# Patient Record
Sex: FEMALE | URBAN - NONMETROPOLITAN AREA
[De-identification: names, ages, dates, MRNs, and addresses within clinical notes are randomized per-mention and may not be internally consistent; named-entity substitution may affect disease eponyms.]

---

## 2017-09-28 ENCOUNTER — OFFICE VISIT (OUTPATIENT)
Dept: GASTROENTEROLOGY | Age: 32
End: 2017-09-28
Payer: COMMERCIAL

## 2017-09-28 VITALS
HEART RATE: 81 BPM | HEIGHT: 65 IN | SYSTOLIC BLOOD PRESSURE: 122 MMHG | WEIGHT: 197.2 LBS | OXYGEN SATURATION: 98 % | DIASTOLIC BLOOD PRESSURE: 70 MMHG | BODY MASS INDEX: 32.86 KG/M2

## 2017-09-28 DIAGNOSIS — R10.13 EPIGASTRIC DISCOMFORT: ICD-10-CM

## 2017-09-28 DIAGNOSIS — R74.01 ELEVATED AST (SGOT): ICD-10-CM

## 2017-09-28 DIAGNOSIS — K58.2 IRRITABLE BOWEL SYNDROME WITH BOTH CONSTIPATION AND DIARRHEA: ICD-10-CM

## 2017-09-28 DIAGNOSIS — R10.13 EPIGASTRIC DISCOMFORT: Primary | ICD-10-CM

## 2017-09-28 LAB
ALBUMIN SERPL-MCNC: 4 G/DL (ref 3.5–5.2)
ALP BLD-CCNC: 78 U/L (ref 35–104)
ALT SERPL-CCNC: 42 U/L (ref 5–33)
AST SERPL-CCNC: 64 U/L (ref 5–32)
BILIRUB SERPL-MCNC: <0.2 MG/DL (ref 0.2–1.2)
BILIRUBIN DIRECT: 0.1 MG/DL (ref 0–0.3)
BILIRUBIN, INDIRECT: 0.1 MG/DL (ref 0.1–1)
TOTAL PROTEIN: 6.8 G/DL (ref 6.6–8.7)

## 2017-09-28 PROCEDURE — 99203 OFFICE O/P NEW LOW 30 MIN: CPT | Performed by: NURSE PRACTITIONER

## 2017-09-28 RX ORDER — PANTOPRAZOLE SODIUM 20 MG/1
20 TABLET, DELAYED RELEASE ORAL DAILY
COMMUNITY

## 2017-09-28 RX ORDER — DICYCLOMINE HCL 20 MG
TABLET ORAL
COMMUNITY
Start: 2017-08-23 | End: 2017-09-28 | Stop reason: DRUGHIGH

## 2017-09-28 RX ORDER — SUCRALFATE ORAL 1 G/10ML
1 SUSPENSION ORAL
Qty: 900 ML | Refills: 2 | Status: SHIPPED | OUTPATIENT
Start: 2017-09-28

## 2017-09-28 RX ORDER — DICYCLOMINE HCL 20 MG
20 TABLET ORAL 3 TIMES DAILY
Qty: 90 TABLET | Refills: 11 | Status: SHIPPED | OUTPATIENT
Start: 2017-09-28

## 2017-09-28 ASSESSMENT — ENCOUNTER SYMPTOMS
BACK PAIN: 0
CONSTIPATION: 0
SORE THROAT: 0
RECTAL PAIN: 0
COUGH: 0
PHOTOPHOBIA: 0
ABDOMINAL PAIN: 1
BLOOD IN STOOL: 0
ABDOMINAL DISTENTION: 0
ANAL BLEEDING: 0
DIARRHEA: 1
WHEEZING: 0
CHOKING: 0

## 2017-09-29 ENCOUNTER — TELEPHONE (OUTPATIENT)
Dept: GASTROENTEROLOGY | Age: 32
End: 2017-09-29

## 2017-10-01 LAB — TISSUE TRANSGLUTAMINASE IGA: 1 U/ML (ref 0–3)

## 2017-10-02 ENCOUNTER — TELEPHONE (OUTPATIENT)
Dept: GASTROENTEROLOGY | Age: 32
End: 2017-10-02

## 2017-10-03 NOTE — TELEPHONE ENCOUNTER
Please let Jacy know that I have the results of labs back on her. The lab I got to check for celiac sprue was normal level. I wanted to check the HFP in a few weeks, but for some reason she got it done the day I saw her in office. Her AST has come down a lot, this is good. It was 187, now it is 64. Normal is <32. Lets recheck HFP in 6 weeks.

## 2017-11-01 ENCOUNTER — TELEPHONE (OUTPATIENT)
Dept: GASTROENTEROLOGY | Age: 32
End: 2017-11-01

## 2017-11-13 ENCOUNTER — TELEPHONE (OUTPATIENT)
Dept: GASTROENTEROLOGY | Age: 32
End: 2017-11-13

## 2017-11-13 NOTE — TELEPHONE ENCOUNTER
See last phone encounter dated 11-1-17. Patient sent me a my chart message and I decided to call her on the phone to get additional information. 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 I will send to [facility] aprn for review. Please advise.  Lewis lozano

## 2017-11-14 NOTE — TELEPHONE ENCOUNTER
Terry Neither, I looked at the colon op note from last year with Dr Katheran Goltz. She has large internal hemorrhoid and anal fissure. If she is wanting tx for her currrent rectal bleeding there are a few options:  (1) appt with Dr Tony Calvin for hemorrhoidal banding  (2) I can escribe RX topical meds for hemorrhoids  (3) if she is also having rectal pain it is prob the fissure that is the problem and in this case I can escribe meds for this. Let me know what she wants to do, thanks.

## 2017-11-14 NOTE — TELEPHONE ENCOUNTER
I called the patient and she said thank you for looking at this, she did find last night some cream she had from where  had prescribed and she will use this for the anal fissure, if this does help she will call back, she said the hemorrhoids is not the problem but she feels the fissure is, again she will call back if the medication cream she has does not help her.   marta

## 2017-11-20 NOTE — TELEPHONE ENCOUNTER
I would get eval via her OB/GYN. From a GI standpoint, the main thing I would want to rule out is a rectovaginal fistula, but I would think she would have infections if this was the case. From our standpoint, I would do a flex sig and if normal, then a barium enema to rule out a tract from rectum to vaginal area.

## 2017-11-20 NOTE — TELEPHONE ENCOUNTER
Patient sent a [employer] message to Binh and I sent it to Luciano to address with both Palak Guan and Binh out today. Patient has called now also, adding to her issues. Patient has been talking to her aunt who is a nurse at [facility] and she also wanted her to call back with this information. Patient states she is only having rectal bleeding with her periods. It starts with her period and ends with it. There is no pain involved. I went back over Palak Guan and Lauren 56 conversation and recommendations with patient. I stated I would ask Luciano who is the only APRN in today and see if Dr Laura Jimenez would also advise, is this a GYN question, does she need banding or hemorrhoid cream? She does not have the name of the medication Dr Deyanira Lerma gave her, she just states it had an applicator and it was a white tube. I will call patient back with advice when I get it on her cell [phone].

## 2017-11-20 NOTE — TELEPHONE ENCOUNTER
Patient advised of Dr Jerry Steven recommendations and that JAMIE Ward will also be forwarded this information for when she comes back tomorrow. Patient will call her GYN and set up eval and let us know if we need to proceed with Dr Akhtar's recommended procedures. She voiced understanding and appreciation.

## 2024-07-01 DIAGNOSIS — D50.8 OTHER IRON DEFICIENCY ANEMIA: Primary | ICD-10-CM

## 2024-07-10 ENCOUNTER — LAB (OUTPATIENT)
Dept: LAB | Facility: HOSPITAL | Age: 39
End: 2024-07-10
Payer: COMMERCIAL

## 2024-07-10 ENCOUNTER — CONSULT (OUTPATIENT)
Dept: ONCOLOGY | Facility: CLINIC | Age: 39
End: 2024-07-10
Payer: COMMERCIAL

## 2024-07-10 VITALS
HEART RATE: 103 BPM | TEMPERATURE: 98.8 F | WEIGHT: 194 LBS | SYSTOLIC BLOOD PRESSURE: 112 MMHG | HEIGHT: 65 IN | BODY MASS INDEX: 32.32 KG/M2 | RESPIRATION RATE: 16 BRPM | OXYGEN SATURATION: 99 % | DIASTOLIC BLOOD PRESSURE: 68 MMHG

## 2024-07-10 DIAGNOSIS — E54 VITAMIN C DEFICIENCY: ICD-10-CM

## 2024-07-10 DIAGNOSIS — D50.8 OTHER IRON DEFICIENCY ANEMIA: Primary | ICD-10-CM

## 2024-07-10 DIAGNOSIS — E53.8 B12 DEFICIENCY: ICD-10-CM

## 2024-07-10 DIAGNOSIS — K64.9 BLEEDING HEMORRHOIDS: ICD-10-CM

## 2024-07-10 PROBLEM — D50.9 IRON DEFICIENCY ANEMIA: Status: ACTIVE | Noted: 2024-07-10

## 2024-07-10 LAB
ALBUMIN SERPL-MCNC: 4.3 G/DL (ref 3.5–5.2)
ALBUMIN/GLOB SERPL: 1.7 G/DL
ALP SERPL-CCNC: 75 U/L (ref 39–117)
ALT SERPL W P-5'-P-CCNC: 7 U/L (ref 1–33)
ANION GAP SERPL CALCULATED.3IONS-SCNC: 8 MMOL/L (ref 5–15)
AST SERPL-CCNC: 13 U/L (ref 1–32)
BASOPHILS # BLD AUTO: 0.03 10*3/MM3 (ref 0–0.2)
BASOPHILS NFR BLD AUTO: 0.8 % (ref 0–1.5)
BILIRUB SERPL-MCNC: 0.4 MG/DL (ref 0–1.2)
BUN SERPL-MCNC: 9 MG/DL (ref 6–20)
BUN/CREAT SERPL: 10.7 (ref 7–25)
CALCIUM SPEC-SCNC: 9.5 MG/DL (ref 8.6–10.5)
CHLORIDE SERPL-SCNC: 106 MMOL/L (ref 98–107)
CO2 SERPL-SCNC: 23 MMOL/L (ref 22–29)
CREAT SERPL-MCNC: 0.84 MG/DL (ref 0.57–1)
DEPRECATED RDW RBC AUTO: 40.9 FL (ref 37–54)
EGFRCR SERPLBLD CKD-EPI 2021: 92.5 ML/MIN/1.73
EOSINOPHIL # BLD AUTO: 0.1 10*3/MM3 (ref 0–0.4)
EOSINOPHIL NFR BLD AUTO: 2.7 % (ref 0.3–6.2)
ERYTHROCYTE [DISTWIDTH] IN BLOOD BY AUTOMATED COUNT: 12.1 % (ref 12.3–15.4)
FERRITIN SERPL-MCNC: 8.82 NG/ML (ref 13–150)
FOLATE SERPL-MCNC: 10.6 NG/ML (ref 4.78–24.2)
GLOBULIN UR ELPH-MCNC: 2.5 GM/DL
GLUCOSE SERPL-MCNC: 83 MG/DL (ref 65–99)
HCT VFR BLD AUTO: 37.3 % (ref 34–46.6)
HGB BLD-MCNC: 11.8 G/DL (ref 12–15.9)
HOLD SPECIMEN: NORMAL
IMM GRANULOCYTES # BLD AUTO: 0 10*3/MM3 (ref 0–0.05)
IMM GRANULOCYTES NFR BLD AUTO: 0 % (ref 0–0.5)
IRON 24H UR-MRATE: 62 MCG/DL (ref 37–145)
IRON SATN MFR SERPL: 13 % (ref 20–50)
LYMPHOCYTES # BLD AUTO: 1.15 10*3/MM3 (ref 0.7–3.1)
LYMPHOCYTES NFR BLD AUTO: 31.4 % (ref 19.6–45.3)
MCH RBC QN AUTO: 29.1 PG (ref 26.6–33)
MCHC RBC AUTO-ENTMCNC: 31.6 G/DL (ref 31.5–35.7)
MCV RBC AUTO: 92.1 FL (ref 79–97)
MONOCYTES # BLD AUTO: 0.3 10*3/MM3 (ref 0.1–0.9)
MONOCYTES NFR BLD AUTO: 8.2 % (ref 5–12)
NEUTROPHILS NFR BLD AUTO: 2.08 10*3/MM3 (ref 1.7–7)
NEUTROPHILS NFR BLD AUTO: 56.9 % (ref 42.7–76)
NRBC BLD AUTO-RTO: 0 /100 WBC (ref 0–0.2)
PLATELET # BLD AUTO: 270 10*3/MM3 (ref 140–450)
PMV BLD AUTO: 10.4 FL (ref 6–12)
POTASSIUM SERPL-SCNC: 3.6 MMOL/L (ref 3.5–5.2)
PROT SERPL-MCNC: 6.8 G/DL (ref 6–8.5)
RBC # BLD AUTO: 4.05 10*6/MM3 (ref 3.77–5.28)
SODIUM SERPL-SCNC: 137 MMOL/L (ref 136–145)
TIBC SERPL-MCNC: 478 MCG/DL (ref 298–536)
TRANSFERRIN SERPL-MCNC: 321 MG/DL (ref 200–360)
VIT B12 BLD-MCNC: 418 PG/ML (ref 211–946)
WBC NRBC COR # BLD AUTO: 3.66 10*3/MM3 (ref 3.4–10.8)
WHOLE BLOOD HOLD SPECIMEN: NORMAL
WHOLE BLOOD HOLD SPECIMEN: NORMAL

## 2024-07-10 PROCEDURE — 84466 ASSAY OF TRANSFERRIN: CPT

## 2024-07-10 PROCEDURE — 82728 ASSAY OF FERRITIN: CPT

## 2024-07-10 PROCEDURE — 82746 ASSAY OF FOLIC ACID SERUM: CPT

## 2024-07-10 PROCEDURE — 99205 OFFICE O/P NEW HI 60 MIN: CPT | Performed by: NURSE PRACTITIONER

## 2024-07-10 PROCEDURE — 82607 VITAMIN B-12: CPT

## 2024-07-10 PROCEDURE — 80053 COMPREHEN METABOLIC PANEL: CPT

## 2024-07-10 PROCEDURE — 82180 ASSAY OF ASCORBIC ACID: CPT

## 2024-07-10 PROCEDURE — 83540 ASSAY OF IRON: CPT

## 2024-07-10 PROCEDURE — 85025 COMPLETE CBC W/AUTO DIFF WBC: CPT

## 2024-07-10 PROCEDURE — 36415 COLL VENOUS BLD VENIPUNCTURE: CPT

## 2024-07-10 RX ORDER — DIPHENHYDRAMINE HYDROCHLORIDE 50 MG/ML
50 INJECTION INTRAMUSCULAR; INTRAVENOUS AS NEEDED
OUTPATIENT
Start: 2024-07-26

## 2024-07-10 RX ORDER — SODIUM CHLORIDE 9 MG/ML
20 INJECTION, SOLUTION INTRAVENOUS ONCE
OUTPATIENT
Start: 2024-07-26

## 2024-07-10 RX ORDER — ACETAMINOPHEN 325 MG/1
650 TABLET ORAL ONCE
Status: CANCELLED | OUTPATIENT
Start: 2024-07-11

## 2024-07-10 RX ORDER — ACETAMINOPHEN 325 MG/1
650 TABLET ORAL ONCE
OUTPATIENT
Start: 2024-07-19

## 2024-07-10 RX ORDER — SODIUM CHLORIDE 9 MG/ML
20 INJECTION, SOLUTION INTRAVENOUS ONCE
OUTPATIENT
Start: 2024-07-19

## 2024-07-10 RX ORDER — FAMOTIDINE 10 MG/ML
20 INJECTION, SOLUTION INTRAVENOUS AS NEEDED
OUTPATIENT
Start: 2024-07-19

## 2024-07-10 RX ORDER — DIPHENHYDRAMINE HYDROCHLORIDE 50 MG/ML
50 INJECTION INTRAMUSCULAR; INTRAVENOUS AS NEEDED
OUTPATIENT
Start: 2024-07-19

## 2024-07-10 RX ORDER — DIPHENHYDRAMINE HYDROCHLORIDE 50 MG/ML
50 INJECTION INTRAMUSCULAR; INTRAVENOUS AS NEEDED
Status: CANCELLED | OUTPATIENT
Start: 2024-07-11

## 2024-07-10 RX ORDER — FAMOTIDINE 10 MG/ML
20 INJECTION, SOLUTION INTRAVENOUS AS NEEDED
OUTPATIENT
Start: 2024-07-26

## 2024-07-10 RX ORDER — DIPHENHYDRAMINE HCL 25 MG
25 CAPSULE ORAL ONCE
OUTPATIENT
Start: 2024-07-19

## 2024-07-10 RX ORDER — DIPHENHYDRAMINE HCL 25 MG
25 CAPSULE ORAL ONCE
OUTPATIENT
Start: 2024-07-26

## 2024-07-10 RX ORDER — FERRIC MALTOL 30 MG/1
30 CAPSULE ORAL 2 TIMES DAILY
COMMUNITY

## 2024-07-10 RX ORDER — SODIUM CHLORIDE 9 MG/ML
20 INJECTION, SOLUTION INTRAVENOUS ONCE
Status: CANCELLED | OUTPATIENT
Start: 2024-07-11

## 2024-07-10 RX ORDER — ACETAMINOPHEN 325 MG/1
650 TABLET ORAL ONCE
OUTPATIENT
Start: 2024-07-26

## 2024-07-10 RX ORDER — DIPHENHYDRAMINE HCL 25 MG
25 CAPSULE ORAL ONCE
Status: CANCELLED | OUTPATIENT
Start: 2024-07-11

## 2024-07-10 RX ORDER — FAMOTIDINE 10 MG/ML
20 INJECTION, SOLUTION INTRAVENOUS AS NEEDED
Status: CANCELLED | OUTPATIENT
Start: 2024-07-11

## 2024-07-10 NOTE — LETTER
July 10, 2024     THANIA Huerta  [address] [address]    Patient: Marni Adair   YOB: 1985   Date of Visit: 7/10/2024     Dear THANIA Huerta:       Thank you for referring Marni Adair to me for evaluation. Below are the relevant portions of my assessment and plan of care.    If you have questions, please do not hesitate to call me. I look forward to following Marni along with you.         Sincerely,        THANIA Duncan        CC: No Recipients    Carolyn Abraham APRN  07/10/24 1309  Sign when Signing Visit  [facility]  [address]  [facility] [place] [address]  [phone]    Patient Name: Marni Adair  Encounter Date: 07/10/2024   YOB: 1985  Patient Number: [id number]    Initial Note       HISTORY OF PRESENT ILLNESS: Marni Adair is a 38 y.o. female referred by THANIA Huerta for diagnostic and management recommendations for iron deficiency. History is obtained from patient. History is considered to be accurate.  History of Present Illness    The patient has been under the care of a hematologist in [place] for chronic anemia, a condition that she states has persisted for over a decade.  In 2021, she underwent a hysterectomy in an attempt to stop the iron deficiency.  She continues to have low iron and was found to have bleeding hemorrhoids.  She was scheduled for surgery in 12/2023. She last saw Dr. Edwin Shin, colorectal surgeon at [facility] in 12/2023, but declined to return. She experiences severe pain, which she manages with ibuprofen, but Tylenol proves ineffective. She typically takes two ibuprofen tablets, which requires her to rest for 30 to 45 minutes.   She feels her her rectal bleeding is exacerbated by Linzess 145 mcg which she takes for for chronic constipation but it causes diarrhea.  Her gastroenterologist in [place] reduced her Linzess  dosage to 72 mcg, which has improved her diarrhea. She consumes crackers and yogurt prior to taking Linzess.     She has been receiving Venofer infusions for iron deficiency, with the last infusion administered in April 2024.     She also has history of vitamin C deficiency.  She has not been taking vitamin C for the past few weeks due to depletion. She has a bruise on the side of her leg. Her B12 levels have consistently decreased, and she was receiving monthly B12 injections by Dr. Wright.         She had a workup for bleeding disorder.  Below is the charting from care everywhere.    Interpretation provided by Surinder Rodney MD: Marni Adair is a 37-year-old female with iron deficiency anemia and is referred to hematology to rule out bleeding disorder. She has had hemorrhoidal bleeding in the past, without bleeding issues during a hysterectomy in 2021.      A von Willebrand factor profile and platelet function studies are performed. The platelet count (321,000/uL) and hematocrit (36%) are normal. The PT (13.0 sec) and PTT (25.6 sec) are normal. Fibrinogen (288 mg/dL) is normal. Factor VIII activity (435%), vWF antigen (193%), and ristocetin cofactor activity (180%) are elevated. The PFA-100 platelet function screen shows closure times of 117.0 sec (ADP) which is slightly prolonged and 144.0 sec (epinephrine), which is normal.    These results do not support a diagnosis of von Willebrand disease. However, factor VIII, von Willebrand factor, and ristocetin cofactor activity are elevated above the patients baseline in the setting of acute stress (as evidenced by the elevated factor VIII activity). If there is significant clinical concern for von Willebrand disease or defect in platelet function, testing may be repeated as an outpatient once stressors have resolved. Additionally, the mild prolongation of the PFA-100 closure time with ADP is not likely clinically significant. These results do not support a diagnosis of  a defect in platelet function.      He has been taking Accrufer but it causes significant GI distress.     [place] - Colorectal Surgery   [place] - Gastro  [place]  - Hematology     PAST MEDICAL HISTORY:  ALLERGIES:  Not on File  CURRENT MEDICATIONS:  Outpatient Encounter Medications as of 7/10/2024   Medication Sig Dispense Refill   • linaclotide (Linzess) 72 MCG capsule capsule Take 1 capsule by mouth Every Morning Before Breakfast.     • ACCRUFeR 30 MG capsule Take 1 capsule by mouth 2 (Two) Times a Day. Causes GI issues (Patient not taking: Reported on 7/10/2024)       No facility-administered encounter medications on file as of 7/10/2024.     ADULT ILLNESSES:  Patient Active Problem List   Diagnosis Code   • Iron deficiency anemia D50.9       HEALTH MAINTENANCE ITEMS:  Health Maintenance Due   Topic Date Due   • BMI FOLLOWUP  Never done   • TDAP/TD VACCINES (1 - Tdap) Never done   • COVID-19 Vaccine (4 - 2023-24 season) 09/01/2023   • HEPATITIS C SCREENING  Never done   • ANNUAL PHYSICAL  Never done       <no information>  Last Completed Colonoscopy       This patient has no relevant Health Maintenance data.          Immunization History   Administered Date(s) Administered   • COVID-19 (MODERNA) 1st,2nd,3rd Dose Monovalent 05/14/2021   • COVID-19 (MODERNA) Monovalent Original Booster 01/10/2022     Last Completed Mammogram       This patient has no relevant Health Maintenance data.              FAMILY HISTORY:  History reviewed. No pertinent family history.  SOCIAL HISTORY:  Social History     Socioeconomic History   • Marital status:    Tobacco Use   • Smoking status: Never   Vaping Use   • Vaping status: Some Days   Substance and Sexual Activity   • Alcohol use: Yes     Comment: occasionally   • Drug use: Never   • Sexual activity: Defer       REVIEW OF SYSTEMS:  Review of Systems   Constitutional:  Positive for fatigue.   Cardiovascular:  Positive for palpitations.   Gastrointestinal:  Positive for anal  "bleeding.   Genitourinary:         Hysterectomy Dec 2021         /68   Pulse 103   Temp 98.8 °F (37.1 °C)   Resp 16   Ht 165.1 cm (65\")   Wt 88 kg (194 lb)   SpO2 99%   BMI 32.28 kg/m²  Body surface area is 1.95 meters squared.    Pain Score    07/10/24 0816   PainSc: 0-No pain         Physical Exam  Constitutional:       Appearance: Normal appearance.   HENT:      Head: Normocephalic and atraumatic.   Cardiovascular:      Rate and Rhythm: Normal rate and regular rhythm.   Pulmonary:      Effort: Pulmonary effort is normal.      Breath sounds: Normal breath sounds.   Abdominal:      General: Bowel sounds are normal.      Palpations: Abdomen is soft.   Musculoskeletal:      Right lower leg: No edema.      Left lower leg: No edema.   Skin:     General: Skin is warm and dry.   Neurological:      Mental Status: She is alert and oriented to person, place, and time.   Psychiatric:         Attention and Perception: Attention normal.         Mood and Affect: Mood normal.         Judgment: Judgment normal.       Marni Adair reports a pain score of 0.  Given her pain assessment as noted, treatment options were discussed and the following options were decided upon as a follow-up plan to address the patient's pain:  No intervention indicated. .      ASSESSMENT / PLAN:  Recent Results (from the past 168 hour(s))   Lavender Top    Collection Time: 07/10/24  8:05 AM   Result Value Ref Range    Extra Tube hold for add-on    Lavender Top    Collection Time: 07/10/24  8:05 AM   Result Value Ref Range    Extra Tube hold for add-on    Grn Na Hep No Gel    Collection Time: 07/10/24  8:05 AM    Specimen: Arm, Right; Blood   Result Value Ref Range    Extra Tube Hold for add-ons.    Grn Na Hep No Gel    Collection Time: 07/10/24  8:05 AM    Specimen: Arm, Right; Blood   Result Value Ref Range    Extra Tube Hold for add-ons.    Gold Top - SST    Collection Time: 07/10/24  8:05 AM   Result Value Ref Range    Extra Tube Hold for " add-ons.    Gold Top - SST    Collection Time: 07/10/24  8:05 AM   Result Value Ref Range    Extra Tube Hold for add-ons.    Gold Top - SST    Collection Time: 07/10/24  8:05 AM   Result Value Ref Range    Extra Tube Hold for add-ons.    Royal Blue EDTA    Collection Time: 07/10/24  8:05 AM    Specimen: Arm, Right; Blood   Result Value Ref Range    Extra Tube Hold for add-ons.    Comprehensive Metabolic Panel    Collection Time: 07/10/24  8:07 AM    Specimen: Arm, Right; Blood   Result Value Ref Range    Glucose 83 65 - 99 mg/dL    BUN 9 6 - 20 mg/dL    Creatinine 0.84 0.57 - 1.00 mg/dL    Sodium 137 136 - 145 mmol/L    Potassium 3.6 3.5 - 5.2 mmol/L    Chloride 106 98 - 107 mmol/L    CO2 23.0 22.0 - 29.0 mmol/L    Calcium 9.5 8.6 - 10.5 mg/dL    Total Protein 6.8 6.0 - 8.5 g/dL    Albumin 4.3 3.5 - 5.2 g/dL    ALT (SGPT) 7 1 - 33 U/L    AST (SGOT) 13 1 - 32 U/L    Alkaline Phosphatase 75 39 - 117 U/L    Total Bilirubin 0.4 0.0 - 1.2 mg/dL    Globulin 2.5 gm/dL    A/G Ratio 1.7 g/dL    BUN/Creatinine Ratio 10.7 7.0 - 25.0    Anion Gap 8.0 5.0 - 15.0 mmol/L    eGFR 92.5 >60.0 mL/min/1.73   Iron and TIBC    Collection Time: 07/10/24  8:07 AM    Specimen: Arm, Right; Blood   Result Value Ref Range    Iron 62 37 - 145 mcg/dL    Iron Saturation (TSAT) 13 (L) 20 - 50 %    Transferrin 321 200 - 360 mg/dL    TIBC 478 298 - 536 mcg/dL   Ferritin    Collection Time: 07/10/24  8:07 AM    Specimen: Arm, Right; Blood   Result Value Ref Range    Ferritin 8.82 (L) 13.00 - 150.00 ng/mL   CBC Auto Differential    Collection Time: 07/10/24  8:07 AM    Specimen: Arm, Right; Blood   Result Value Ref Range    WBC 3.66 3.40 - 10.80 10*3/mm3    RBC 4.05 3.77 - 5.28 10*6/mm3    Hemoglobin 11.8 (L) 12.0 - 15.9 g/dL    Hematocrit 37.3 34.0 - 46.6 %    MCV 92.1 79.0 - 97.0 fL    MCH 29.1 26.6 - 33.0 pg    MCHC 31.6 31.5 - 35.7 g/dL    RDW 12.1 (L) 12.3 - 15.4 %    RDW-SD 40.9 37.0 - 54.0 fl    MPV 10.4 6.0 - 12.0 fL    Platelets 270 140 - 450  10*3/mm3    Neutrophil % 56.9 42.7 - 76.0 %    Lymphocyte % 31.4 19.6 - 45.3 %    Monocyte % 8.2 5.0 - 12.0 %    Eosinophil % 2.7 0.3 - 6.2 %    Basophil % 0.8 0.0 - 1.5 %    Immature Grans % 0.0 0.0 - 0.5 %    Neutrophils, Absolute 2.08 1.70 - 7.00 10*3/mm3    Lymphocytes, Absolute 1.15 0.70 - 3.10 10*3/mm3    Monocytes, Absolute 0.30 0.10 - 0.90 10*3/mm3    Eosinophils, Absolute 0.10 0.00 - 0.40 10*3/mm3    Basophils, Absolute 0.03 0.00 - 0.20 10*3/mm3    Immature Grans, Absolute 0.00 0.00 - 0.05 10*3/mm3    nRBC 0.0 0.0 - 0.2 /100 WBC     Results  Laboratory Studies  White blood cell total is 3.3. Neutrophils 1.58. Total white count is 3.6. Ferritin is 10.6. Hemoglobin is 11.8.    1. Other iron deficiency anemia    2. Bleeding hemorrhoids    3. Vitamin C deficiency    4. B12 deficiency       Assessment & Plan  1. Iron deficiency anemia.  Pt has been followed by Hematology at [facility]  The patient's hemoglobin and iron levels are low, likely due to her persistent bleeding.      Will order Venofer 400 mg IV x 3     2. Hemorrhoids.   Advised pt to follow up with her colorectal surgeon at [facility].  Discussed until the bleeding is corrected iron deficiency can be a persistent problem.      3.  Vitamin C Deficiency   -Vitamin C level was less than 5 on 11/29/2023.   -Pt has been taking 500 mg daily.  -Lab pending today. Will increase Vit C to 1000 mg daily.    4.  B12 Deficiency   -Lab pending   -Continue monthly injections     Follow-up  A follow-up appointment is scheduled for 3 months from now.   Her labs will be rechecked in 6 weeks, with CBC, CMP, iron profile, ferritin, and vitamin C levels to be drawn 1 week prior to her office visit in 3 months.   Continue current medications, treatment plans and follow up with PCP and any other providers     Care discussed with patient.  Understanding expressed.  Patient agreeable with plan.     Thank you for the referral.    I spent 60 minutes caring for Marni on this date of  service (50 mins face to face). This time includes time spent by me in the following activities: preparing for the visit, reviewing tests, performing a medically appropriate examination and/or evaluation, counseling and educating the patient/family/caregiver, ordering medications, tests, or procedures, documenting information in the medical record, and care coordination.        THANIA Duncan  07/10/2024     Patient or patient representative verbalized consent for the use of Ambient Listening during the visit with  THANIA Duncan for chart documentation. 7/10/2024  12:53 CDT

## 2024-07-10 NOTE — PROGRESS NOTES
[facility]ATOLOGY & [facility]COLOGY  [address]  [facility] [address] [phone]    Patient Name: Marni Adair  Encounter Date: 07/10/2024   YOB: 1985  Patient Number: [id number]    Initial Note       HISTORY OF PRESENT ILLNESS: Marni Adair is a 38 y.o. female referred by THANIA Huerta for diagnostic and management recommendations for iron deficiency. History is obtained from patient. History is considered to be accurate.  History of Present Illness    The patient has been under the care of a hematologist in [place] for chronic anemia, a condition that she states has persisted for over a decade.  In 2021, she underwent a hysterectomy in an attempt to stop the iron deficiency.  She continues to have low iron and was found to have bleeding hemorrhoids.  She was scheduled for surgery in 12/2023. She last saw Dr. Edwin Shin, colorectal surgeon at [facility] in 12/2023, but declined to return. She experiences severe pain, which she manages with ibuprofen, but Tylenol proves ineffective. She typically takes two ibuprofen tablets, which requires her to rest for 30 to 45 minutes.   She feels her her rectal bleeding is exacerbated by Linzess 145 mcg which she takes for for chronic constipation but it causes diarrhea.  Her gastroenterologist in [place] reduced her Linzess dosage to 72 mcg, which has improved her diarrhea. She consumes crackers and yogurt prior to taking Linzess.     She has been receiving Venofer infusions for iron deficiency, with the last infusion administered in April 2024.     She also has history of vitamin C deficiency.  She has not been taking vitamin C for the past few weeks due to depletion. She has a bruise on the side of her leg. Her B12 levels have consistently decreased, and she was receiving monthly B12 injections by Dr. Wright.         She had a workup for bleeding disorder.  Below is the charting  from care everywhere.    Interpretation provided by Surinder Rodney MD: Marni Adair is a 37-year-old female with iron deficiency anemia and is referred to hematology to rule out bleeding disorder. She has had hemorrhoidal bleeding in the past, without bleeding issues during a hysterectomy in 2021.      A von Willebrand factor profile and platelet function studies are performed. The platelet count (321,000/uL) and hematocrit (36%) are normal. The PT (13.0 sec) and PTT (25.6 sec) are normal. Fibrinogen (288 mg/dL) is normal. Factor VIII activity (435%), vWF antigen (193%), and ristocetin cofactor activity (180%) are elevated. The PFA-100 platelet function screen shows closure times of 117.0 sec (ADP) which is slightly prolonged and 144.0 sec (epinephrine), which is normal.    These results do not support a diagnosis of von Willebrand disease. However, factor VIII, von Willebrand factor, and ristocetin cofactor activity are elevated above the patients baseline in the setting of acute stress (as evidenced by the elevated factor VIII activity). If there is significant clinical concern for von Willebrand disease or defect in platelet function, testing may be repeated as an outpatient once stressors have resolved. Additionally, the mild prolongation of the PFA-100 closure time with ADP is not likely clinically significant. These results do not support a diagnosis of a defect in platelet function.      He has been taking Accrufer but it causes significant GI distress.     [facility] - Colorectal Surgery   [facility] - Gastro  [place]  - Hematology     PAST MEDICAL HISTORY:  ALLERGIES:  Not on File  CURRENT MEDICATIONS:  Outpatient Encounter Medications as of 7/10/2024   Medication Sig Dispense Refill    linaclotide (Linzess) 72 MCG capsule capsule Take 1 capsule by mouth Every Morning Before Breakfast.      ACCRUFeR 30 MG capsule Take 1 capsule by mouth 2 (Two) Times a Day. Causes GI issues (Patient not taking: Reported on  "7/10/2024)       No facility-administered encounter medications on file as of 7/10/2024.     ADULT ILLNESSES:  Patient Active Problem List   Diagnosis Code    Iron deficiency anemia D50.9       HEALTH MAINTENANCE ITEMS:  Health Maintenance Due   Topic Date Due    BMI FOLLOWUP  Never done    TDAP/TD VACCINES (1 - Tdap) Never done    COVID-19 Vaccine (4 - 2023-24 season) 09/01/2023    HEPATITIS C SCREENING  Never done    ANNUAL PHYSICAL  Never done       <no information>  Last Completed Colonoscopy       This patient has no relevant Health Maintenance data.          Immunization History   Administered Date(s) Administered    COVID-19 (MODERNA) 1st,2nd,3rd Dose Monovalent 05/14/2021    COVID-19 (MODERNA) Monovalent Original Booster 01/10/2022     Last Completed Mammogram       This patient has no relevant Health Maintenance data.              FAMILY HISTORY:  History reviewed. No pertinent family history.  SOCIAL HISTORY:  Social History     Socioeconomic History    Marital status:    Tobacco Use    Smoking status: Never   Vaping Use    Vaping status: Some Days   Substance and Sexual Activity    Alcohol use: Yes     Comment: occasionally    Drug use: Never    Sexual activity: Defer       REVIEW OF SYSTEMS:  Review of Systems   Constitutional:  Positive for fatigue.   Cardiovascular:  Positive for palpitations.   Gastrointestinal:  Positive for anal bleeding.   Genitourinary:         Hysterectomy Dec 2021         /68   Pulse 103   Temp 98.8 °F (37.1 °C)   Resp 16   Ht 165.1 cm (65\")   Wt 88 kg (194 lb)   SpO2 99%   BMI 32.28 kg/m²  Body surface area is 1.95 meters squared.    Pain Score    07/10/24 0816   PainSc: 0-No pain         Physical Exam  Constitutional:       Appearance: Normal appearance.   HENT:      Head: Normocephalic and atraumatic.   Cardiovascular:      Rate and Rhythm: Normal rate and regular rhythm.   Pulmonary:      Effort: Pulmonary effort is normal.      Breath sounds: Normal " breath sounds.   Abdominal:      General: Bowel sounds are normal.      Palpations: Abdomen is soft.   Musculoskeletal:      Right lower leg: No edema.      Left lower leg: No edema.   Skin:     General: Skin is warm and dry.   Neurological:      Mental Status: She is alert and oriented to person, place, and time.   Psychiatric:         Attention and Perception: Attention normal.         Mood and Affect: Mood normal.         Judgment: Judgment normal.       Marni Adair reports a pain score of 0.  Given her pain assessment as noted, treatment options were discussed and the following options were decided upon as a follow-up plan to address the patient's pain:  No intervention indicated. .      ASSESSMENT / PLAN:  Recent Results (from the past 168 hour(s))   Lavender Top    Collection Time: 07/10/24  8:05 AM   Result Value Ref Range    Extra Tube hold for add-on    Lavender Top    Collection Time: 07/10/24  8:05 AM   Result Value Ref Range    Extra Tube hold for add-on    Grn Na Hep No Gel    Collection Time: 07/10/24  8:05 AM    Specimen: Arm, Right; Blood   Result Value Ref Range    Extra Tube Hold for add-ons.    Grn Na Hep No Gel    Collection Time: 07/10/24  8:05 AM    Specimen: Arm, Right; Blood   Result Value Ref Range    Extra Tube Hold for add-ons.    Gold Top - SST    Collection Time: 07/10/24  8:05 AM   Result Value Ref Range    Extra Tube Hold for add-ons.    Gold Top - SST    Collection Time: 07/10/24  8:05 AM   Result Value Ref Range    Extra Tube Hold for add-ons.    Gold Top - SST    Collection Time: 07/10/24  8:05 AM   Result Value Ref Range    Extra Tube Hold for add-ons.    Royal Blue EDTA    Collection Time: 07/10/24  8:05 AM    Specimen: Arm, Right; Blood   Result Value Ref Range    Extra Tube Hold for add-ons.    Comprehensive Metabolic Panel    Collection Time: 07/10/24  8:07 AM    Specimen: Arm, Right; Blood   Result Value Ref Range    Glucose 83 65 - 99 mg/dL    BUN 9 6 - 20 mg/dL    Creatinine  0.84 0.57 - 1.00 mg/dL    Sodium 137 136 - 145 mmol/L    Potassium 3.6 3.5 - 5.2 mmol/L    Chloride 106 98 - 107 mmol/L    CO2 23.0 22.0 - 29.0 mmol/L    Calcium 9.5 8.6 - 10.5 mg/dL    Total Protein 6.8 6.0 - 8.5 g/dL    Albumin 4.3 3.5 - 5.2 g/dL    ALT (SGPT) 7 1 - 33 U/L    AST (SGOT) 13 1 - 32 U/L    Alkaline Phosphatase 75 39 - 117 U/L    Total Bilirubin 0.4 0.0 - 1.2 mg/dL    Globulin 2.5 gm/dL    A/G Ratio 1.7 g/dL    BUN/Creatinine Ratio 10.7 7.0 - 25.0    Anion Gap 8.0 5.0 - 15.0 mmol/L    eGFR 92.5 >60.0 mL/min/1.73   Iron and TIBC    Collection Time: 07/10/24  8:07 AM    Specimen: Arm, Right; Blood   Result Value Ref Range    Iron 62 37 - 145 mcg/dL    Iron Saturation (TSAT) 13 (L) 20 - 50 %    Transferrin 321 200 - 360 mg/dL    TIBC 478 298 - 536 mcg/dL   Ferritin    Collection Time: 07/10/24  8:07 AM    Specimen: Arm, Right; Blood   Result Value Ref Range    Ferritin 8.82 (L) 13.00 - 150.00 ng/mL   CBC Auto Differential    Collection Time: 07/10/24  8:07 AM    Specimen: Arm, Right; Blood   Result Value Ref Range    WBC 3.66 3.40 - 10.80 10*3/mm3    RBC 4.05 3.77 - 5.28 10*6/mm3    Hemoglobin 11.8 (L) 12.0 - 15.9 g/dL    Hematocrit 37.3 34.0 - 46.6 %    MCV 92.1 79.0 - 97.0 fL    MCH 29.1 26.6 - 33.0 pg    MCHC 31.6 31.5 - 35.7 g/dL    RDW 12.1 (L) 12.3 - 15.4 %    RDW-SD 40.9 37.0 - 54.0 fl    MPV 10.4 6.0 - 12.0 fL    Platelets 270 140 - 450 10*3/mm3    Neutrophil % 56.9 42.7 - 76.0 %    Lymphocyte % 31.4 19.6 - 45.3 %    Monocyte % 8.2 5.0 - 12.0 %    Eosinophil % 2.7 0.3 - 6.2 %    Basophil % 0.8 0.0 - 1.5 %    Immature Grans % 0.0 0.0 - 0.5 %    Neutrophils, Absolute 2.08 1.70 - 7.00 10*3/mm3    Lymphocytes, Absolute 1.15 0.70 - 3.10 10*3/mm3    Monocytes, Absolute 0.30 0.10 - 0.90 10*3/mm3    Eosinophils, Absolute 0.10 0.00 - 0.40 10*3/mm3    Basophils, Absolute 0.03 0.00 - 0.20 10*3/mm3    Immature Grans, Absolute 0.00 0.00 - 0.05 10*3/mm3    nRBC 0.0 0.0 - 0.2 /100 WBC     Results  Laboratory  Studies  White blood cell total is 3.3. Neutrophils 1.58. Total white count is 3.6. Ferritin is 10.6. Hemoglobin is 11.8.    1. Other iron deficiency anemia    2. Bleeding hemorrhoids    3. Vitamin C deficiency    4. B12 deficiency       Assessment & Plan  1. Iron deficiency anemia.  Pt has been followed by Hematology at [facility]  The patient's hemoglobin and iron levels are low, likely due to her persistent bleeding.      Will order Venofer 400 mg IV x 3     2. Hemorrhoids.   Advised pt to follow up with her colorectal surgeon at [facility].  Discussed until the bleeding is corrected iron deficiency can be a persistent problem.      3.  Vitamin C Deficiency   -Vitamin C level was less than 5 on 11/29/2023.   -Pt has been taking 500 mg daily.  -Lab pending today. Will increase Vit C to 1000 mg daily.    4.  B12 Deficiency   -Lab pending   -Continue monthly injections     Follow-up  A follow-up appointment is scheduled for 3 months from now.   Her labs will be rechecked in 6 weeks, with CBC, CMP, iron profile, ferritin, and vitamin C levels to be drawn 1 week prior to her office visit in 3 months.   Continue current medications, treatment plans and follow up with PCP and any other providers     Care discussed with patient.  Understanding expressed.  Patient agreeable with plan.     Thank you for the referral.    I spent 60 minutes caring for Marni on this date of service (50 mins face to face). This time includes time spent by me in the following activities: preparing for the visit, reviewing tests, performing a medically appropriate examination and/or evaluation, counseling and educating the patient/family/caregiver, ordering medications, tests, or procedures, documenting information in the medical record, and care coordination.        THANIA Duncan  07/10/2024     Patient or patient representative verbalized consent for the use of Ambient Listening during the visit with  THANIA Duncan for chart documentation.  7/10/2024  12:53 CDT

## 2024-07-10 NOTE — PATIENT INSTRUCTIONS
Will order Venofer 400 mg IV weekly x 3 weeks   Increase Vit C to 1000 mg daily (2 tabs)   Labs in 6 weeks   Labs one week before office visit in 3 months.   Continue monthly B12 injections

## 2024-07-11 ENCOUNTER — INFUSION (OUTPATIENT)
Dept: ONCOLOGY | Facility: HOSPITAL | Age: 39
End: 2024-07-11
Payer: COMMERCIAL

## 2024-07-11 VITALS
HEART RATE: 75 BPM | RESPIRATION RATE: 18 BRPM | SYSTOLIC BLOOD PRESSURE: 95 MMHG | OXYGEN SATURATION: 98 % | TEMPERATURE: 98.3 F | DIASTOLIC BLOOD PRESSURE: 66 MMHG

## 2024-07-11 DIAGNOSIS — D50.9 IRON DEFICIENCY ANEMIA, UNSPECIFIED IRON DEFICIENCY ANEMIA TYPE: Primary | ICD-10-CM

## 2024-07-11 PROCEDURE — 96365 THER/PROPH/DIAG IV INF INIT: CPT

## 2024-07-11 PROCEDURE — 25010000002 IRON SUCROSE PER 1 MG: Performed by: NURSE PRACTITIONER

## 2024-07-11 PROCEDURE — 96366 THER/PROPH/DIAG IV INF ADDON: CPT

## 2024-07-11 PROCEDURE — 25810000003 SODIUM CHLORIDE 0.9 % SOLUTION: Performed by: NURSE PRACTITIONER

## 2024-07-11 PROCEDURE — 25810000003 SODIUM CHLORIDE 0.9 % SOLUTION 250 ML FLEX CONT: Performed by: NURSE PRACTITIONER

## 2024-07-11 RX ORDER — FAMOTIDINE 10 MG/ML
20 INJECTION, SOLUTION INTRAVENOUS AS NEEDED
Status: DISCONTINUED | OUTPATIENT
Start: 2024-07-11 | End: 2024-07-11 | Stop reason: HOSPADM

## 2024-07-11 RX ORDER — DIPHENHYDRAMINE HYDROCHLORIDE 50 MG/ML
50 INJECTION INTRAMUSCULAR; INTRAVENOUS AS NEEDED
Status: DISCONTINUED | OUTPATIENT
Start: 2024-07-11 | End: 2024-07-11 | Stop reason: HOSPADM

## 2024-07-11 RX ORDER — SODIUM CHLORIDE 9 MG/ML
20 INJECTION, SOLUTION INTRAVENOUS ONCE
Status: COMPLETED | OUTPATIENT
Start: 2024-07-11 | End: 2024-07-11

## 2024-07-11 RX ADMIN — SODIUM CHLORIDE 20 ML/HR: 9 INJECTION, SOLUTION INTRAVENOUS at 09:50

## 2024-07-11 RX ADMIN — IRON SUCROSE 400 MG: 20 INJECTION, SOLUTION INTRAVENOUS at 09:59

## 2024-07-12 ENCOUNTER — PATIENT ROUNDING (BHMG ONLY) (OUTPATIENT)
Dept: ONCOLOGY | Facility: CLINIC | Age: 39
End: 2024-07-12
Payer: COMMERCIAL

## 2024-07-12 NOTE — PROGRESS NOTES
July 12, 2024    Hello, may I speak with Marni Adair?    My name is Amy       I am  with [facility] HEMATOLOGY & ONCOLOGY  [address]  [facility] [facility] [address] [phone].    Before we get started may I verify your date of birth? 1985    I am calling to officially welcome you to our practice and ask about your recent visit. Is this a good time to talk? yes    Tell me about your visit with us. What things went well?  It went great. Everything went really good.        We're always looking for ways to make our patients' experiences even better. Do you have recommendations on ways we may improve?  no    Overall were you satisfied with your first visit to our practice? yes       I appreciate you taking the time to speak with me today. Is there anything else I can do for you? no      Thank you, and have a great day.

## 2024-07-13 LAB — VIT C SERPL-MCNC: <0.1 MG/DL (ref 0.4–2)

## 2024-07-26 ENCOUNTER — INFUSION (OUTPATIENT)
Dept: ONCOLOGY | Facility: HOSPITAL | Age: 39
End: 2024-07-26
Payer: COMMERCIAL

## 2024-07-26 VITALS
DIASTOLIC BLOOD PRESSURE: 62 MMHG | BODY MASS INDEX: 34.32 KG/M2 | OXYGEN SATURATION: 98 % | TEMPERATURE: 97.4 F | HEIGHT: 65 IN | WEIGHT: 206 LBS | HEART RATE: 74 BPM | SYSTOLIC BLOOD PRESSURE: 93 MMHG | RESPIRATION RATE: 16 BRPM

## 2024-07-26 DIAGNOSIS — D50.9 IRON DEFICIENCY ANEMIA, UNSPECIFIED IRON DEFICIENCY ANEMIA TYPE: Primary | ICD-10-CM

## 2024-07-26 PROCEDURE — 25010000002 IRON SUCROSE PER 1 MG: Performed by: NURSE PRACTITIONER

## 2024-07-26 PROCEDURE — 25810000003 SODIUM CHLORIDE 0.9 % SOLUTION 250 ML FLEX CONT: Performed by: NURSE PRACTITIONER

## 2024-07-26 PROCEDURE — 25810000003 SODIUM CHLORIDE 0.9 % SOLUTION: Performed by: NURSE PRACTITIONER

## 2024-07-26 PROCEDURE — 96365 THER/PROPH/DIAG IV INF INIT: CPT

## 2024-07-26 PROCEDURE — 96366 THER/PROPH/DIAG IV INF ADDON: CPT

## 2024-07-26 RX ORDER — FAMOTIDINE 10 MG/ML
20 INJECTION, SOLUTION INTRAVENOUS AS NEEDED
Status: DISCONTINUED | OUTPATIENT
Start: 2024-07-26 | End: 2024-07-26 | Stop reason: HOSPADM

## 2024-07-26 RX ORDER — SODIUM CHLORIDE 9 MG/ML
20 INJECTION, SOLUTION INTRAVENOUS ONCE
Status: COMPLETED | OUTPATIENT
Start: 2024-07-26 | End: 2024-07-26

## 2024-07-26 RX ORDER — ACETAMINOPHEN 325 MG/1
650 TABLET ORAL ONCE
Status: DISCONTINUED | OUTPATIENT
Start: 2024-07-26 | End: 2024-07-26 | Stop reason: HOSPADM

## 2024-07-26 RX ORDER — DIPHENHYDRAMINE HCL 25 MG
25 CAPSULE ORAL ONCE
Status: DISCONTINUED | OUTPATIENT
Start: 2024-07-26 | End: 2024-07-26 | Stop reason: HOSPADM

## 2024-07-26 RX ORDER — DIPHENHYDRAMINE HYDROCHLORIDE 50 MG/ML
50 INJECTION INTRAMUSCULAR; INTRAVENOUS AS NEEDED
Status: DISCONTINUED | OUTPATIENT
Start: 2024-07-26 | End: 2024-07-26 | Stop reason: HOSPADM

## 2024-07-26 RX ADMIN — IRON SUCROSE 400 MG: 20 INJECTION, SOLUTION INTRAVENOUS at 10:50

## 2024-07-26 RX ADMIN — SODIUM CHLORIDE 20 ML/HR: 9 INJECTION, SOLUTION INTRAVENOUS at 10:45

## 2024-07-30 ENCOUNTER — INFUSION (OUTPATIENT)
Dept: ONCOLOGY | Facility: HOSPITAL | Age: 39
End: 2024-07-30
Payer: COMMERCIAL

## 2024-07-30 VITALS
WEIGHT: 206.6 LBS | RESPIRATION RATE: 18 BRPM | TEMPERATURE: 97.8 F | BODY MASS INDEX: 34.42 KG/M2 | DIASTOLIC BLOOD PRESSURE: 60 MMHG | HEART RATE: 74 BPM | OXYGEN SATURATION: 98 % | HEIGHT: 65 IN | SYSTOLIC BLOOD PRESSURE: 103 MMHG

## 2024-07-30 DIAGNOSIS — D50.9 IRON DEFICIENCY ANEMIA, UNSPECIFIED IRON DEFICIENCY ANEMIA TYPE: Primary | ICD-10-CM

## 2024-07-30 PROCEDURE — 25810000003 SODIUM CHLORIDE 0.9 % SOLUTION: Performed by: NURSE PRACTITIONER

## 2024-07-30 PROCEDURE — 96365 THER/PROPH/DIAG IV INF INIT: CPT

## 2024-07-30 PROCEDURE — 25810000003 SODIUM CHLORIDE 0.9 % SOLUTION 250 ML FLEX CONT: Performed by: NURSE PRACTITIONER

## 2024-07-30 PROCEDURE — 25010000002 IRON SUCROSE PER 1 MG: Performed by: NURSE PRACTITIONER

## 2024-07-30 PROCEDURE — 96366 THER/PROPH/DIAG IV INF ADDON: CPT

## 2024-07-30 RX ORDER — ACETAMINOPHEN 325 MG/1
650 TABLET ORAL ONCE
Status: DISCONTINUED | OUTPATIENT
Start: 2024-07-30 | End: 2024-07-30 | Stop reason: HOSPADM

## 2024-07-30 RX ORDER — DIPHENHYDRAMINE HCL 25 MG
25 CAPSULE ORAL ONCE
Status: DISCONTINUED | OUTPATIENT
Start: 2024-07-30 | End: 2024-07-30 | Stop reason: HOSPADM

## 2024-07-30 RX ORDER — LUBIPROSTONE 8 UG/1
8 CAPSULE ORAL 2 TIMES DAILY WITH MEALS
COMMUNITY

## 2024-07-30 RX ORDER — SODIUM CHLORIDE 9 MG/ML
20 INJECTION, SOLUTION INTRAVENOUS ONCE
Status: COMPLETED | OUTPATIENT
Start: 2024-07-30 | End: 2024-07-30

## 2024-07-30 RX ADMIN — IRON SUCROSE 400 MG: 20 INJECTION, SOLUTION INTRAVENOUS at 11:28

## 2024-07-30 RX ADMIN — SODIUM CHLORIDE 20 ML/HR: 9 INJECTION, SOLUTION INTRAVENOUS at 11:28

## 2024-09-01 ENCOUNTER — HOSPITAL ENCOUNTER (OUTPATIENT)
Facility: HOSPITAL | Age: 39
Setting detail: OBSERVATION
Discharge: HOME | End: 2024-09-02
Attending: EMERGENCY MEDICINE | Admitting: FAMILY MEDICINE
Payer: COMMERCIAL

## 2024-09-01 ENCOUNTER — APPOINTMENT (OUTPATIENT)
Dept: CT IMAGING | Facility: HOSPITAL | Age: 39
End: 2024-09-01
Payer: COMMERCIAL

## 2024-09-01 DIAGNOSIS — D64.9 ANEMIA, UNSPECIFIED TYPE: ICD-10-CM

## 2024-09-01 DIAGNOSIS — D50.9 IRON DEFICIENCY ANEMIA, UNSPECIFIED IRON DEFICIENCY ANEMIA TYPE: ICD-10-CM

## 2024-09-01 DIAGNOSIS — R55 SYNCOPE AND COLLAPSE: ICD-10-CM

## 2024-09-01 DIAGNOSIS — K92.2 ACUTE LOWER GI BLEEDING: Primary | ICD-10-CM

## 2024-09-01 LAB
ABO GROUP BLD: NORMAL
ALBUMIN SERPL-MCNC: 3.8 G/DL (ref 3.5–5.2)
ALBUMIN/GLOB SERPL: 1.5 G/DL
ALP SERPL-CCNC: 78 U/L (ref 39–117)
ALT SERPL W P-5'-P-CCNC: 11 U/L (ref 1–33)
ANION GAP SERPL CALCULATED.3IONS-SCNC: 11 MMOL/L (ref 5–15)
ANION GAP SERPL CALCULATED.3IONS-SCNC: 6 MMOL/L (ref 5–15)
AST SERPL-CCNC: 17 U/L (ref 1–32)
B-HCG UR QL: NEGATIVE
BASOPHILS # BLD AUTO: 0.05 10*3/MM3 (ref 0–0.2)
BASOPHILS NFR BLD AUTO: 0.5 % (ref 0–1.5)
BILIRUB SERPL-MCNC: 0.3 MG/DL (ref 0–1.2)
BILIRUB UR QL STRIP: NEGATIVE
BLD GP AB SCN SERPL QL: NEGATIVE
BUN SERPL-MCNC: 11 MG/DL (ref 6–20)
BUN SERPL-MCNC: 13 MG/DL (ref 6–20)
BUN/CREAT SERPL: 14.9 (ref 7–25)
BUN/CREAT SERPL: 18.8 (ref 7–25)
CALCIUM SPEC-SCNC: 7.9 MG/DL (ref 8.6–10.5)
CALCIUM SPEC-SCNC: 8.8 MG/DL (ref 8.6–10.5)
CHLORIDE SERPL-SCNC: 108 MMOL/L (ref 98–107)
CHLORIDE SERPL-SCNC: 110 MMOL/L (ref 98–107)
CLARITY UR: CLEAR
CO2 SERPL-SCNC: 20 MMOL/L (ref 22–29)
CO2 SERPL-SCNC: 22 MMOL/L (ref 22–29)
COLOR UR: ABNORMAL
CREAT SERPL-MCNC: 0.69 MG/DL (ref 0.57–1)
CREAT SERPL-MCNC: 0.74 MG/DL (ref 0.57–1)
DEPRECATED RDW RBC AUTO: 53.9 FL (ref 37–54)
DEVELOPER EXPIRATION DATE: ABNORMAL
DEVELOPER LOT NUMBER: 271
EGFRCR SERPLBLD CKD-EPI 2021: 107.7 ML/MIN/1.73
EGFRCR SERPLBLD CKD-EPI 2021: 115.5 ML/MIN/1.73
EOSINOPHIL # BLD AUTO: 0.29 10*3/MM3 (ref 0–0.4)
EOSINOPHIL NFR BLD AUTO: 2.7 % (ref 0.3–6.2)
ERYTHROCYTE [DISTWIDTH] IN BLOOD BY AUTOMATED COUNT: 15.1 % (ref 12.3–15.4)
EXPIRATION DATE: ABNORMAL
FECAL OCCULT BLOOD SCREEN, POC: POSITIVE
FERRITIN SERPL-MCNC: 78.08 NG/ML (ref 13–150)
GLOBULIN UR ELPH-MCNC: 2.5 GM/DL
GLUCOSE SERPL-MCNC: 106 MG/DL (ref 65–99)
GLUCOSE SERPL-MCNC: 122 MG/DL (ref 65–99)
GLUCOSE UR STRIP-MCNC: NEGATIVE MG/DL
HCT VFR BLD AUTO: 28.7 % (ref 34–46.6)
HCT VFR BLD AUTO: 29.2 % (ref 34–46.6)
HCT VFR BLD AUTO: 29.6 % (ref 34–46.6)
HCT VFR BLD AUTO: 37.1 % (ref 34–46.6)
HGB BLD-MCNC: 11.7 G/DL (ref 12–15.9)
HGB BLD-MCNC: 9 G/DL (ref 12–15.9)
HGB BLD-MCNC: 9.2 G/DL (ref 12–15.9)
HGB BLD-MCNC: 9.6 G/DL (ref 12–15.9)
HGB UR QL STRIP.AUTO: NEGATIVE
HOLD SPECIMEN: NORMAL
HOLD SPECIMEN: NORMAL
IMM GRANULOCYTES # BLD AUTO: 0.03 10*3/MM3 (ref 0–0.05)
IMM GRANULOCYTES NFR BLD AUTO: 0.3 % (ref 0–0.5)
IRON 24H UR-MRATE: 50 MCG/DL (ref 37–145)
IRON SATN MFR SERPL: 14 % (ref 20–50)
KETONES UR QL STRIP: NEGATIVE
LEUKOCYTE ESTERASE UR QL STRIP.AUTO: NEGATIVE
LIPASE SERPL-CCNC: 33 U/L (ref 13–60)
LYMPHOCYTES # BLD AUTO: 2.29 10*3/MM3 (ref 0.7–3.1)
LYMPHOCYTES NFR BLD AUTO: 21.1 % (ref 19.6–45.3)
Lab: 271
MCH RBC QN AUTO: 30.6 PG (ref 26.6–33)
MCHC RBC AUTO-ENTMCNC: 31.5 G/DL (ref 31.5–35.7)
MCV RBC AUTO: 97.1 FL (ref 79–97)
MONOCYTES # BLD AUTO: 0.52 10*3/MM3 (ref 0.1–0.9)
MONOCYTES NFR BLD AUTO: 4.8 % (ref 5–12)
NEGATIVE CONTROL: NEGATIVE
NEUTROPHILS NFR BLD AUTO: 7.65 10*3/MM3 (ref 1.7–7)
NEUTROPHILS NFR BLD AUTO: 70.6 % (ref 42.7–76)
NITRITE UR QL STRIP: NEGATIVE
NRBC BLD AUTO-RTO: 0 /100 WBC (ref 0–0.2)
PH UR STRIP.AUTO: 5.5 [PH] (ref 5–8)
PLATELET # BLD AUTO: 280 10*3/MM3 (ref 140–450)
PMV BLD AUTO: 10.2 FL (ref 6–12)
POSITIVE CONTROL: POSITIVE
POTASSIUM SERPL-SCNC: 3.6 MMOL/L (ref 3.5–5.2)
POTASSIUM SERPL-SCNC: 4.1 MMOL/L (ref 3.5–5.2)
POTASSIUM SERPL-SCNC: 4.3 MMOL/L (ref 3.5–5.2)
PROT SERPL-MCNC: 6.3 G/DL (ref 6–8.5)
PROT UR QL STRIP: NEGATIVE
QT INTERVAL: 382 MS
QTC INTERVAL: 397 MS
RBC # BLD AUTO: 3.82 10*6/MM3 (ref 3.77–5.28)
RH BLD: POSITIVE
SODIUM SERPL-SCNC: 138 MMOL/L (ref 136–145)
SODIUM SERPL-SCNC: 139 MMOL/L (ref 136–145)
SP GR UR STRIP: 1.02 (ref 1–1.03)
T&S EXPIRATION DATE: NORMAL
TIBC SERPL-MCNC: 367 MCG/DL (ref 298–536)
TRANSFERRIN SERPL-MCNC: 246 MG/DL (ref 200–360)
TROPONIN T SERPL HS-MCNC: <6 NG/L
UROBILINOGEN UR QL STRIP: ABNORMAL
WBC NRBC COR # BLD AUTO: 10.83 10*3/MM3 (ref 3.4–10.8)
WHOLE BLOOD HOLD COAG: NORMAL
WHOLE BLOOD HOLD SPECIMEN: NORMAL

## 2024-09-01 PROCEDURE — 83540 ASSAY OF IRON: CPT | Performed by: STUDENT IN AN ORGANIZED HEALTH CARE EDUCATION/TRAINING PROGRAM

## 2024-09-01 PROCEDURE — 85018 HEMOGLOBIN: CPT | Performed by: EMERGENCY MEDICINE

## 2024-09-01 PROCEDURE — 93005 ELECTROCARDIOGRAM TRACING: CPT | Performed by: EMERGENCY MEDICINE

## 2024-09-01 PROCEDURE — 85014 HEMATOCRIT: CPT | Performed by: INTERNAL MEDICINE

## 2024-09-01 PROCEDURE — 74174 CTA ABD&PLVS W/CONTRAST: CPT

## 2024-09-01 PROCEDURE — G0378 HOSPITAL OBSERVATION PER HR: HCPCS

## 2024-09-01 PROCEDURE — 83690 ASSAY OF LIPASE: CPT | Performed by: EMERGENCY MEDICINE

## 2024-09-01 PROCEDURE — 86901 BLOOD TYPING SEROLOGIC RH(D): CPT | Performed by: EMERGENCY MEDICINE

## 2024-09-01 PROCEDURE — 86850 RBC ANTIBODY SCREEN: CPT | Performed by: EMERGENCY MEDICINE

## 2024-09-01 PROCEDURE — 25810000003 LACTATED RINGERS PER 1000 ML: Performed by: STUDENT IN AN ORGANIZED HEALTH CARE EDUCATION/TRAINING PROGRAM

## 2024-09-01 PROCEDURE — 96361 HYDRATE IV INFUSION ADD-ON: CPT

## 2024-09-01 PROCEDURE — 25510000001 IOPAMIDOL PER 1 ML: Performed by: STUDENT IN AN ORGANIZED HEALTH CARE EDUCATION/TRAINING PROGRAM

## 2024-09-01 PROCEDURE — 85018 HEMOGLOBIN: CPT | Performed by: INTERNAL MEDICINE

## 2024-09-01 PROCEDURE — 85014 HEMATOCRIT: CPT | Performed by: EMERGENCY MEDICINE

## 2024-09-01 PROCEDURE — 99285 EMERGENCY DEPT VISIT HI MDM: CPT

## 2024-09-01 PROCEDURE — 84484 ASSAY OF TROPONIN QUANT: CPT | Performed by: EMERGENCY MEDICINE

## 2024-09-01 PROCEDURE — 81003 URINALYSIS AUTO W/O SCOPE: CPT | Performed by: EMERGENCY MEDICINE

## 2024-09-01 PROCEDURE — 84466 ASSAY OF TRANSFERRIN: CPT | Performed by: STUDENT IN AN ORGANIZED HEALTH CARE EDUCATION/TRAINING PROGRAM

## 2024-09-01 PROCEDURE — 36415 COLL VENOUS BLD VENIPUNCTURE: CPT

## 2024-09-01 PROCEDURE — 99203 OFFICE O/P NEW LOW 30 MIN: CPT | Performed by: INTERNAL MEDICINE

## 2024-09-01 PROCEDURE — 80053 COMPREHEN METABOLIC PANEL: CPT | Performed by: EMERGENCY MEDICINE

## 2024-09-01 PROCEDURE — 84132 ASSAY OF SERUM POTASSIUM: CPT | Performed by: STUDENT IN AN ORGANIZED HEALTH CARE EDUCATION/TRAINING PROGRAM

## 2024-09-01 PROCEDURE — 82728 ASSAY OF FERRITIN: CPT | Performed by: INTERNAL MEDICINE

## 2024-09-01 PROCEDURE — 96360 HYDRATION IV INFUSION INIT: CPT

## 2024-09-01 PROCEDURE — 25810000003 SODIUM CHLORIDE 0.9 % SOLUTION: Performed by: EMERGENCY MEDICINE

## 2024-09-01 PROCEDURE — 81025 URINE PREGNANCY TEST: CPT | Performed by: EMERGENCY MEDICINE

## 2024-09-01 PROCEDURE — 82728 ASSAY OF FERRITIN: CPT | Performed by: NURSE PRACTITIONER

## 2024-09-01 PROCEDURE — 86900 BLOOD TYPING SEROLOGIC ABO: CPT | Performed by: EMERGENCY MEDICINE

## 2024-09-01 PROCEDURE — 82270 OCCULT BLOOD FECES: CPT | Performed by: EMERGENCY MEDICINE

## 2024-09-01 PROCEDURE — 85025 COMPLETE CBC W/AUTO DIFF WBC: CPT | Performed by: EMERGENCY MEDICINE

## 2024-09-01 RX ORDER — SODIUM CHLORIDE 0.9 % (FLUSH) 0.9 %
10 SYRINGE (ML) INJECTION AS NEEDED
Status: DISCONTINUED | OUTPATIENT
Start: 2024-09-01 | End: 2024-09-02 | Stop reason: HOSPADM

## 2024-09-01 RX ORDER — BISACODYL 5 MG/1
5 TABLET, DELAYED RELEASE ORAL DAILY PRN
Status: DISCONTINUED | OUTPATIENT
Start: 2024-09-01 | End: 2024-09-02 | Stop reason: HOSPADM

## 2024-09-01 RX ORDER — SODIUM CHLORIDE 9 MG/ML
40 INJECTION, SOLUTION INTRAVENOUS AS NEEDED
Status: DISCONTINUED | OUTPATIENT
Start: 2024-09-01 | End: 2024-09-02 | Stop reason: HOSPADM

## 2024-09-01 RX ORDER — LUBIPROSTONE 8 UG/1
8 CAPSULE ORAL 2 TIMES DAILY WITH MEALS
Status: DISCONTINUED | OUTPATIENT
Start: 2024-09-01 | End: 2024-09-02 | Stop reason: HOSPADM

## 2024-09-01 RX ORDER — SODIUM CHLORIDE 0.9 % (FLUSH) 0.9 %
10 SYRINGE (ML) INJECTION EVERY 12 HOURS SCHEDULED
Status: DISCONTINUED | OUTPATIENT
Start: 2024-09-01 | End: 2024-09-02 | Stop reason: HOSPADM

## 2024-09-01 RX ORDER — ACETAMINOPHEN 650 MG/1
650 SUPPOSITORY RECTAL EVERY 4 HOURS PRN
Status: DISCONTINUED | OUTPATIENT
Start: 2024-09-01 | End: 2024-09-02 | Stop reason: HOSPADM

## 2024-09-01 RX ORDER — POTASSIUM CHLORIDE 750 MG/1
40 CAPSULE, EXTENDED RELEASE ORAL EVERY 4 HOURS
Status: COMPLETED | OUTPATIENT
Start: 2024-09-01 | End: 2024-09-01

## 2024-09-01 RX ORDER — AMOXICILLIN 250 MG
2 CAPSULE ORAL 2 TIMES DAILY PRN
Status: DISCONTINUED | OUTPATIENT
Start: 2024-09-01 | End: 2024-09-02 | Stop reason: HOSPADM

## 2024-09-01 RX ORDER — POLYETHYLENE GLYCOL 3350 17 G/17G
17 POWDER, FOR SOLUTION ORAL DAILY PRN
Status: DISCONTINUED | OUTPATIENT
Start: 2024-09-01 | End: 2024-09-02 | Stop reason: HOSPADM

## 2024-09-01 RX ORDER — SODIUM CHLORIDE, SODIUM LACTATE, POTASSIUM CHLORIDE, CALCIUM CHLORIDE 600; 310; 30; 20 MG/100ML; MG/100ML; MG/100ML; MG/100ML
75 INJECTION, SOLUTION INTRAVENOUS CONTINUOUS
Status: DISPENSED | OUTPATIENT
Start: 2024-09-01 | End: 2024-09-01

## 2024-09-01 RX ORDER — BISACODYL 10 MG
10 SUPPOSITORY, RECTAL RECTAL DAILY PRN
Status: DISCONTINUED | OUTPATIENT
Start: 2024-09-01 | End: 2024-09-02 | Stop reason: HOSPADM

## 2024-09-01 RX ORDER — IOPAMIDOL 755 MG/ML
100 INJECTION, SOLUTION INTRAVASCULAR
Status: COMPLETED | OUTPATIENT
Start: 2024-09-01 | End: 2024-09-01

## 2024-09-01 RX ORDER — ONDANSETRON 2 MG/ML
4 INJECTION INTRAMUSCULAR; INTRAVENOUS EVERY 6 HOURS PRN
Status: DISCONTINUED | OUTPATIENT
Start: 2024-09-01 | End: 2024-09-02 | Stop reason: HOSPADM

## 2024-09-01 RX ORDER — ACETAMINOPHEN 325 MG/1
650 TABLET ORAL EVERY 4 HOURS PRN
Status: DISCONTINUED | OUTPATIENT
Start: 2024-09-01 | End: 2024-09-02 | Stop reason: HOSPADM

## 2024-09-01 RX ORDER — ACETAMINOPHEN 160 MG/5ML
650 SOLUTION ORAL EVERY 4 HOURS PRN
Status: DISCONTINUED | OUTPATIENT
Start: 2024-09-01 | End: 2024-09-02 | Stop reason: HOSPADM

## 2024-09-01 RX ADMIN — POTASSIUM CHLORIDE 40 MEQ: 750 CAPSULE, EXTENDED RELEASE ORAL at 14:41

## 2024-09-01 RX ADMIN — IOPAMIDOL 100 ML: 755 INJECTION, SOLUTION INTRAVENOUS at 05:30

## 2024-09-01 RX ADMIN — SODIUM CHLORIDE 1000 ML: 9 INJECTION, SOLUTION INTRAVENOUS at 03:18

## 2024-09-01 RX ADMIN — Medication 10 ML: at 20:47

## 2024-09-01 RX ADMIN — POTASSIUM CHLORIDE 40 MEQ: 750 CAPSULE, EXTENDED RELEASE ORAL at 10:37

## 2024-09-01 RX ADMIN — SODIUM CHLORIDE, POTASSIUM CHLORIDE, SODIUM LACTATE AND CALCIUM CHLORIDE 75 ML/HR: 600; 310; 30; 20 INJECTION, SOLUTION INTRAVENOUS at 06:04

## 2024-09-01 NOTE — PLAN OF CARE
Goal Outcome Evaluation:    Problem: Adult Inpatient Plan of Care  Goal: Plan of Care Review  Outcome: Ongoing, Not Progressing  Flowsheets (Taken 9/1/2024 0619)  Progress: no change  Plan of Care Reviewed With: patient  Outcome Evaluation: Patient admitted to  this morning. No c/o pain as of this time. IVF infusing. Patient is NPO at this time. Plan of care ongoing.

## 2024-09-01 NOTE — H&P
[facility] Medicine Services  HISTORY AND PHYSICAL    Date of Admission: 9/1/2024  Primary Care Physician: Guillermina Jovel APRN    Subjective   Primary Historian: The patient    Chief Complaint: Rectal bleeding with syncope    Syncope    Foot Injury       The patient is a 38-year-old lady with a PMH significant for IBS, hemorrhoids with multiple colonoscopies who presents to the ED on 9/1/2020 for on account of rectal bleeding with 3 episodes of passing out witnessed by her spouse with associated lightheadedness sweating and blacking out, denies any seizures or history of seizure, urinary or fecal incontinence.  Denies any nausea, vomiting, fever or chills.  In the ED vital signs were stable saturating 100% on room air, labs CMP unremarkable, CBC-WBC 10.83, Hgb 11.7 repeat now 9.6, PLTs 280, MCV 97.1 urinalysis negative for UTI, fecal occult blood positive  CTA abdomen/pelvis report pending  The patient will be admitted for lower GI bleed with syncope, the patient is full code at this time GI consulted will hold off any anticoagulation and resume necessary home medications once verified.      Review of Systems   Cardiovascular:  Positive for syncope.      Otherwise complete ROS reviewed and negative except as mentioned in the HPI.    Past Medical History:   Past Medical History:   Diagnosis Date    Hemorrhoids     Iron deficiency anemia     Pruritic rash      Past Surgical History:  Past Surgical History:   Procedure Laterality Date    DILATATION AND CURETTAGE      HYSTERECTOMY  2021     Social History:  reports that she has never smoked. She does not have any smokeless tobacco history on file. She reports current alcohol use. She reports that she does not use drugs.    Family History: family history is not on file.       Allergies:  No Known Allergies    Medications:  Prior to Admission medications    Medication Sig Start Date End Date Taking? Authorizing Provider   ACCRUFeR 30  "MG capsule Take 1 capsule by mouth 2 (Two) Times a Day. Causes GI issues  Patient not taking: Reported on 7/10/2024    Dimple Acuna MD   linaclotide (Linzess) 72 MCG capsule capsule Take 1 capsule by mouth Every Morning Before Breakfast.  Patient not taking: Reported on 7/30/2024    Dimple Acuna MD   lubiprostone (AMITIZA) 8 MCG capsule Take 1 capsule by mouth 2 (Two) Times a Day With Meals.    ProviderDimple MD     I have utilized all available immediate resources to obtain, update, or review the patient's current medications (including all prescriptions, over-the-counter products, herbals, cannabis/cannabidiol products, and vitamin/mineral/dietary (nutritional) supplements).    Objective     Vital Signs: /61 (BP Location: Left arm, Patient Position: Sitting)   Pulse 71   Temp 98.3 °F (36.8 °C) (Oral)   Resp 19   Ht 165.1 cm (65\")   Wt 93 kg (205 lb 0.4 oz)   SpO2 100%   BMI 34.12 kg/m²   Physical Exam  Constitutional:       Appearance: Normal appearance. She is normal weight.   HENT:      Head: Normocephalic.      Right Ear: External ear normal.      Left Ear: External ear normal.      Nose: Nose normal.      Mouth/Throat:      Mouth: Mucous membranes are moist.   Eyes:      Pupils: Pupils are equal, round, and reactive to light.   Cardiovascular:      Rate and Rhythm: Normal rate and regular rhythm.      Pulses: Normal pulses.      Heart sounds: Normal heart sounds.   Pulmonary:      Effort: Pulmonary effort is normal.      Breath sounds: Normal breath sounds.   Abdominal:      General: Bowel sounds are normal.      Palpations: Abdomen is soft.   Musculoskeletal:         General: Normal range of motion.      Cervical back: Neck supple.   Skin:     General: Skin is warm and dry.   Neurological:      General: No focal deficit present.      Mental Status: She is alert and oriented to person, place, and time.   Psychiatric:         Mood and Affect: Mood normal.        Results " Reviewed:  Lab Results (last 24 hours)       Procedure Component Value Units Date/Time    Hemoglobin & Hematocrit, Blood [[id number]]  (Abnormal) Collected: 09/01/24 0423    Specimen: Blood Updated: 09/01/24 0456     Hemoglobin 9.6 g/dL      Hematocrit 29.6 %     Pregnancy, Urine - Urine, Clean Catch [[id number]]  (Normal) Collected: 09/01/24 0412    Specimen: Urine, Clean Catch Updated: 09/01/24 0443     HCG, Urine QL Negative    Urinalysis With Microscopic If Indicated (No Culture) - Urine, Clean Catch [[id number]]  (Abnormal) Collected: 09/01/24 0412    Specimen: Urine, Clean Catch Updated: 09/01/24 0442     Color, UA Dark Yellow     Appearance, UA Clear     pH, UA 5.5     Specific Gravity, UA 1.023     Glucose, UA Negative     Ketones, UA Negative     Bilirubin, UA Negative     Blood, UA Negative     Protein, UA Negative     Leuk Esterase, UA Negative     Nitrite, UA Negative     Urobilinogen, UA 1.0 E.U./dL    Narrative:      Urine microscopic not indicated.    Single High Sensitivity Troponin T [[id number]]  (Normal) Collected: 09/01/24 0307    Specimen: Blood Updated: 09/01/24 0352     HS Troponin T <6 ng/L     Narrative:      High Sensitive Troponin T Reference Range:  <14.0 ng/L- Negative Female for AMI  <22.0 ng/L- Negative Male for AMI  >=14 - Abnormal Female indicating possible myocardial injury.  >=22 - Abnormal Male indicating possible myocardial injury.   Clinicians would have to utilize clinical acumen, EKG, Troponin, and serial changes to determine if it is an Acute Myocardial Infarction or myocardial injury due to an underlying chronic condition.         Comprehensive Metabolic Panel [[id number]]  (Abnormal) Collected: 09/01/24 0307    Specimen: Blood Updated: 09/01/24 0334     Glucose 122 mg/dL      BUN 13 mg/dL      Creatinine 0.69 mg/dL      Sodium 139 mmol/L      Potassium 3.6 mmol/L      Comment: Slight hemolysis detected by analyzer. Result may be falsely elevated.        Chloride 108 mmol/L       CO2 20.0 mmol/L      Calcium 8.8 mg/dL      Total Protein 6.3 g/dL      Albumin 3.8 g/dL      ALT (SGPT) 11 U/L      AST (SGOT) 17 U/L      Alkaline Phosphatase 78 U/L      Total Bilirubin 0.3 mg/dL      Globulin 2.5 gm/dL      A/G Ratio 1.5 g/dL      BUN/Creatinine Ratio 18.8     Anion Gap 11.0 mmol/L      eGFR 115.5 mL/min/1.73     Narrative:      GFR Normal >60  Chronic Kidney Disease <60  Kidney Failure <15      Lipase [[id number]]  (Normal) Collected: 09/01/24 0307    Specimen: Blood Updated: 09/01/24 0334     Lipase 33 U/L     POC Occult Blood Stool [[id number]]  (Abnormal) Resulted: 09/01/24 0322    Specimen: Stool Updated: 09/01/24 0322     Fecal Occult Blood Positive     Lot Number [id number]     Expiration Date 02/28/2025     DEVELOPER LOT NUMBER 271     DEVELOPER EXPIRATION DATE 02/28/2025     Positive Control Positive     Negative Control Negative    CBC & Differential [[id number]]  (Abnormal) Collected: 09/01/24 0307    Specimen: Blood Updated: 09/01/24 0319    Narrative:      The following orders were created for panel order CBC & Differential.  Procedure                               Abnormality         Status                     ---------                               -----------         ------                     CBC Auto Differential[[id number]]        Abnormal            Final result                 Please view results for these tests on the individual orders.    CBC Auto Differential [[id number]]  (Abnormal) Collected: 09/01/24 0307    Specimen: Blood Updated: 09/01/24 0319     WBC 10.83 10*3/mm3      RBC 3.82 10*6/mm3      Hemoglobin 11.7 g/dL      Hematocrit 37.1 %      MCV 97.1 fL      MCH 30.6 pg      MCHC 31.5 g/dL      RDW 15.1 %      RDW-SD 53.9 fl      MPV 10.2 fL      Platelets 280 10*3/mm3      Neutrophil % 70.6 %      Lymphocyte % 21.1 %      Monocyte % 4.8 %      Eosinophil % 2.7 %      Basophil % 0.5 %      Immature Grans % 0.3 %      Neutrophils, Absolute 7.65 10*3/mm3      Lymphocytes,  Absolute 2.29 10*3/mm3      Monocytes, Absolute 0.52 10*3/mm3      Eosinophils, Absolute 0.29 10*3/mm3      Basophils, Absolute 0.05 10*3/mm3      Immature Grans, Absolute 0.03 10*3/mm3      nRBC 0.0 /100 WBC     [place] Draw [[id number]] Collected: 09/01/24 0307    Specimen: Blood Updated: 09/01/24 0316    Narrative:      The following orders were created for panel order [place] Draw.  Procedure                               Abnormality         Status                     ---------                               -----------         ------                     Green Top (Gel)[[id number]]                                  Final result               Lavender Top[[id number]]                                     Final result               Red Top[[id number]]                                          Final result               Light Blue Top[[id number]]                                   Final result                 Please view results for these tests on the individual orders.    Green Top (Gel) [[id number]] Collected: 09/01/24 0307    Specimen: Blood Updated: 09/01/24 0316     Extra Tube Hold for add-ons.     Comment: Auto resulted.       Lavender Top [[id number]] Collected: 09/01/24 0307    Specimen: Blood Updated: 09/01/24 0316     Extra Tube hold for add-on     Comment: Auto resulted       Red Top [[id number]] Collected: 09/01/24 0307    Specimen: Blood Updated: 09/01/24 0316     Extra Tube Hold for add-ons.     Comment: Auto resulted.       Light Blue Top [[id number]] Collected: 09/01/24 0307    Specimen: Blood Updated: 09/01/24 0316     Extra Tube Hold for add-ons.     Comment: Auto resulted             Imaging Results (Last 24 Hours)       Procedure Component Value Units Date/Time    CT Angiogram Abdomen Pelvis [[id number]] Resulted: 09/01/24 0525     Updated: 09/01/24 0530          I have personally reviewed and interpreted the radiology studies and ECG obtained at time of admission.     Assessment / Plan   Assessment:   Active  Hospital Problems    Diagnosis     **Lower GI bleed        Treatment Plan  The patient will be admitted to my service here at [facility].   -GI bleed most likely lower with history of hemorrhoids multiple colonoscopies, alcohol blood positive, continue serial H&H monitoring and transfuse when less than 7, continue LR 75 mL for 1 L, avoid anticoagulation, GI consulted will leave patient n.p.o. for now  - Syncope background suspected GI bleed, orthostatic vital signs pending, continue IV fluids, transfuse when H&H is less than 7, if no improvement consider further workup for syncope.  Will consult PT  - IBS continue p.o. lubiprostone  - Iron deficiency anemia history of iron infusions, iron studies pending  Continue current medical management and supportive care      Medical Decision Making  Number and Complexity of problems: 2  Differential Diagnosis: Mentioned above    Conditions and Status        Condition is unchanged.     MDM Data  External documents reviewed: Not indicated  Cardiac tracing (EKG, telemetry) interpretation:   Radiology interpretation: Reviewed  Labs reviewed: Yes  Any tests that were considered but not ordered: No        Discussed with: Patient and spouse     Care Planning  Shared decision making: The patient and spouse  Code status and discussions: Full code    Disposition  Social Determinants of Health that impact treatment or disposition: Home  Estimated length of stay is 1 to 2 days.     I confirmed that the patient's advanced care plan is present, code status is documented, and a surrogate decision maker is listed in the patient's medical record.     The patient's surrogate decision maker is spouse.     The patient was seen and examined by me on 9/1/2020 for at 5:20 AM.    Electronically signed by Oumou Ny MD, 09/01/24, 05:35 CDT.

## 2024-09-01 NOTE — ED PROVIDER NOTES
Subjective   History of Present Illness  Pt presents to the  with report of rectal bleeding and syncope.  Pt has hx of hemorrhoids and IBS - taking linzess.  Reports that she has felt good through the day - tonight felt like she had to have a BM and passed brb per rectum - states she noted some tissue like appearance to stool.  Reports that this occurred 3 times at home.  Reports that she stood up from toilet after the 3rd episode and became lightheaded/sweaty and blacked out for a short time.  Pt states that she blacked out 2-3 times.  Denies any CP/SOB.  No n/v/f/c. No numb/tingling.          Review of Systems   Constitutional:  Negative for chills and fever.   Respiratory:  Negative for shortness of breath.    Cardiovascular:  Negative for chest pain, palpitations and leg swelling.   Gastrointestinal:  Positive for blood in stool. Negative for abdominal pain, nausea and vomiting.   Genitourinary:  Negative for dysuria.   Skin:  Negative for rash.   Neurological:  Positive for syncope. Negative for headaches.   All other systems reviewed and are negative.      History reviewed. No pertinent past medical history.    No Known Allergies    Past Surgical History:   Procedure Laterality Date    DILATATION AND CURETTAGE      HYSTERECTOMY  2021       History reviewed. No pertinent family history.    Social History     Socioeconomic History    Marital status:    Tobacco Use    Smoking status: Never   Vaping Use    Vaping status: Some Days   Substance and Sexual Activity    Alcohol use: Yes     Comment: occasionally    Drug use: Never    Sexual activity: Defer           Objective   Physical Exam  Vitals and nursing note reviewed.   Constitutional:       General: She is not in acute distress.  HENT:      Head: Normocephalic.      Nose: Nose normal.      Mouth/Throat:      Mouth: Mucous membranes are moist.   Eyes:      Extraocular Movements: Extraocular movements intact.      Conjunctiva/sclera: Conjunctivae  normal.      Pupils: Pupils are equal, round, and reactive to light.   Cardiovascular:      Rate and Rhythm: Normal rate and regular rhythm.      Pulses: Normal pulses.      Heart sounds: Normal heart sounds.   Pulmonary:      Effort: Pulmonary effort is normal.      Breath sounds: Normal breath sounds.   Abdominal:      General: Abdomen is flat. Bowel sounds are normal.      Palpations: Abdomen is soft.   Genitourinary:     Rectum: Guaiac result positive.      Comments: + hemorrhoids noted - no active bleeding noted externally.    Skin:     General: Skin is warm and dry.      Capillary Refill: Capillary refill takes less than 2 seconds.   Neurological:      General: No focal deficit present.      Mental Status: She is alert and oriented to person, place, and time.         Procedures           ED Course      Labs Reviewed   COMPREHENSIVE METABOLIC PANEL - Abnormal; Notable for the following components:       Result Value    Glucose 122 (*)     Chloride 108 (*)     CO2 20.0 (*)     All other components within normal limits    Narrative:     GFR Normal >60  Chronic Kidney Disease <60  Kidney Failure <15     URINALYSIS W/ MICROSCOPIC IF INDICATED (NO CULTURE) - Abnormal; Notable for the following components:    Color, UA Dark Yellow (*)     All other components within normal limits    Narrative:     Urine microscopic not indicated.   CBC WITH AUTO DIFFERENTIAL - Abnormal; Notable for the following components:    WBC 10.83 (*)     Hemoglobin 11.7 (*)     MCV 97.1 (*)     Monocyte % 4.8 (*)     Neutrophils, Absolute 7.65 (*)     All other components within normal limits   HEMOGLOBIN AND HEMATOCRIT, BLOOD - Abnormal; Notable for the following components:    Hemoglobin 9.6 (*)     Hematocrit 29.6 (*)     All other components within normal limits   POCT OCCULT BLOOD STOOL (ED ONLY) - Abnormal; Notable for the following components:    Fecal Occult Blood Positive (*)     All other components within normal limits   LIPASE -  Normal   PREGNANCY, URINE - Normal   SINGLE HS TROPONIN T - Normal    Narrative:     High Sensitive Troponin T Reference Range:  <14.0 ng/L- Negative Female for AMI  <22.0 ng/L- Negative Male for AMI  >=14 - Abnormal Female indicating possible myocardial injury.  >=22 - Abnormal Male indicating possible myocardial injury.   Clinicians would have to utilize clinical acumen, EKG, Troponin, and serial changes to determine if it is an Acute Myocardial Infarction or myocardial injury due to an underlying chronic condition.        RAINBOW DRAW    Narrative:     The following orders were created for panel order [place] Draw.  Procedure                               Abnormality         Status                     ---------                               -----------         ------                     Green Top (Gel)[[id number]]                                  Final result               Lavender Top[[id number]]                                     Final result               Red Top[[id number]]                                          Final result               Light Blue Top[[id number]]                                   Final result                 Please view results for these tests on the individual orders.   TYPE AND SCREEN   GREEN TOP   LAVENDER TOP   RED TOP   LIGHT BLUE TOP   CBC AND DIFFERENTIAL    Narrative:     The following orders were created for panel order CBC & Differential.  Procedure                               Abnormality         Status                     ---------                               -----------         ------                     CBC Auto Differential[[id number]]        Abnormal            Final result                 Please view results for these tests on the individual orders.                                              Medical Decision Making  Pt stable in EC att.  Resting comfortably and in NAD.  NS abdomen.  Pt with known hx of hemorrhoids and no other findings on recent endoscopies.  Suspect  that her bleeding may be hemorrhoidal in nature.  No evid of stroke/ACS.  VSS.  She is noted to have had a decrease on H/H on repeat.  Given this - recommend admit for further mgmt.  D/w Dr. Ny who has accepted pt for admit    Amount and/or Complexity of Data Reviewed  Labs: ordered.  ECG/medicine tests: ordered.        Final diagnoses:   Acute lower GI bleeding   Anemia, unspecified type   Syncope and collapse       ED Disposition  ED Disposition       ED Disposition   Decision to Admit    Condition   --    Comment   --               No follow-up provider specified.       Medication List      No changes were made to your prescriptions during this visit.            Alvino Del Real, DO  09/01/24 0343       Alvino Del Real, DO  09/01/24 0343       Alvino Del Real, DO  09/01/24 0511

## 2024-09-01 NOTE — PROGRESS NOTES
Patient is an admit after midnight by Dr. Nevarez.  Patient presented with rectal bleeding and reported syncope.  She has known history of hemorrhoids, iron deficiency anemia.  Her hemoglobin is 9.6  She has normal BUN and creatinine  She has positive occult blood status and her hemoglobin prior to admission was 11.7  It is suspected that her syncopal episode was related to GI bleed.  Vitals:    09/01/24 0715   BP: 96/51   Pulse: 76   Resp: 18   Temp: 98.5 °F (36.9 °C)   SpO2: 98%       Labs reviewed  She had CT angiogram of the abdomen because I just called no definite abnormality of the GI tract is seen or active extravasation of contrast.    Continue plan of care as outlined by Dr. Nevarez  Patient has no evidence of orthostatic BP reading but could be at the borderline based on heart rate.  Hemoglobin noted at 9.2.-In the past she had iron infusion in [place] due to iron deficiency due to chronic blood loss.  I requested for GI consult dating back to July 31, 2024 from [place].  From other notes reviewed, patient has history of third-degree hemorrhoids requiring banding.  Continue to monitor.  Noted Dr. Barnes's note    Continue present management for now

## 2024-09-01 NOTE — ED NOTES
Nursing report ED to floor  Marni Adair  38 y.o.  female    HPI:   Chief Complaint   Patient presents with    Syncope    Black or Bloody Stool    Foot Injury       Admitting doctor:   Oumou Ny MD    Consulting provider(s):  Consults       No orders found from 8/3/2024 to 9/2/2024.             Admitting diagnosis:   The primary encounter diagnosis was Acute lower GI bleeding. Diagnoses of Anemia, unspecified type and Syncope and collapse were also pertinent to this visit.    Code status:   Current Code Status       Date Active Code Status Order ID Comments User Context       9/1/2024 0534 CPR (Attempt to Resuscitate) [id number]  Oumou Ny MD ED        Question Answer    Code Status (Patient has no pulse and is not breathing) CPR (Attempt to Resuscitate)    Medical Interventions (Patient has pulse or is breathing) Full Support    Level Of Support Discussed With Patient                    Allergies:   Patient has no known allergies.    Intake and Output    Intake/Output Summary (Last 24 hours) at 9/1/2024 0540  Last data filed at 9/1/2024 0423  Gross per 24 hour   Intake 1000 ml   Output --   Net 1000 ml       Weight:       09/01/24  0259   Weight: 93 kg (205 lb 0.4 oz)       Most recent vitals:   Vitals:    09/01/24 0346 09/01/24 0400 09/01/24 0416 09/01/24 0446   BP: 103/72  114/74 100/61   BP Location:   Left arm Left arm   Patient Position:   Sitting Sitting   Pulse: 75  78 71   Resp:   18 19   Temp:  98.3 °F (36.8 °C) 98.3 °F (36.8 °C) 98.3 °F (36.8 °C)   TempSrc:   Oral Oral   SpO2: 100%  95% 100%   Weight:       Height:         Oxygen Therapy: .    Active LDAs/IV Access:   Lines, Drains & Airways       Active LDAs       Name Placement date Placement time Site Days    Peripheral IV 09/01/24 0315 Left Antecubital 09/01/24 0315  Antecubital  less than 1                    Labs (abnormal labs have a star):   Labs Reviewed   COMPREHENSIVE METABOLIC PANEL - Abnormal; Notable for the following components:        Result Value    Glucose 122 (*)     Chloride 108 (*)     CO2 20.0 (*)     All other components within normal limits    Narrative:     GFR Normal >60  Chronic Kidney Disease <60  Kidney Failure <15     URINALYSIS W/ MICROSCOPIC IF INDICATED (NO CULTURE) - Abnormal; Notable for the following components:    Color, UA Dark Yellow (*)     All other components within normal limits    Narrative:     Urine microscopic not indicated.   CBC WITH AUTO DIFFERENTIAL - Abnormal; Notable for the following components:    WBC 10.83 (*)     Hemoglobin 11.7 (*)     MCV 97.1 (*)     Monocyte % 4.8 (*)     Neutrophils, Absolute 7.65 (*)     All other components within normal limits   HEMOGLOBIN AND HEMATOCRIT, BLOOD - Abnormal; Notable for the following components:    Hemoglobin 9.6 (*)     Hematocrit 29.6 (*)     All other components within normal limits   POCT OCCULT BLOOD STOOL (ED ONLY) - Abnormal; Notable for the following components:    Fecal Occult Blood Positive (*)     All other components within normal limits   LIPASE - Normal   PREGNANCY, URINE - Normal   SINGLE HS TROPONIN T - Normal    Narrative:     High Sensitive Troponin T Reference Range:  <14.0 ng/L- Negative Female for AMI  <22.0 ng/L- Negative Male for AMI  >=14 - Abnormal Female indicating possible myocardial injury.  >=22 - Abnormal Male indicating possible myocardial injury.   Clinicians would have to utilize clinical acumen, EKG, Troponin, and serial changes to determine if it is an Acute Myocardial Infarction or myocardial injury due to an underlying chronic condition.        RAINBOW DRAW    Narrative:     The following orders were created for panel order [place] Draw.  Procedure                               Abnormality         Status                     ---------                               -----------         ------                     Green Top (Gel)[[id number]]                                  Final result               Lavender Top[[id number]]                                      Final result               Red Top[[id number]]                                          Final result               Light Blue Top[[id number]]                                   Final result                 Please view results for these tests on the individual orders.   TYPE AND SCREEN   GREEN TOP   LAVENDER TOP   RED TOP   LIGHT BLUE TOP   CBC AND DIFFERENTIAL    Narrative:     The following orders were created for panel order CBC & Differential.  Procedure                               Abnormality         Status                     ---------                               -----------         ------                     CBC Auto Differential[[id number]]        Abnormal            Final result                 Please view results for these tests on the individual orders.       Meds given in ED:   Medications   sodium chloride 0.9 % bolus 1,000 mL (0 mL Intravenous Stopped 9/1/24 2673)   iopamidol (ISOVUE-370) 76 % injection 100 mL (100 mL Intravenous Given 9/1/24 0530)     No current facility-administered medications for this encounter.       NIH Stroke Scale:       Isolation/Infection(s):  No active isolations   No active infections     COVID Testing  Collected .  Resulted .    Nursing report ED to floor:  Mental status: .  Ambulatory status: .  Precautions: .    ED nurse phone extentsion- ..

## 2024-09-01 NOTE — CONSULTS
Inpatient Gastroenterology Consult  Referring Provider: Rickie Chris*  Gastroenterologist of Record: Nujimmy ([place])  Reason for Consultation: GI bleed    Subjective     History of present illness: Patient has a long history of GI bleeding and subsequent anemia.  She says that she has undergone 2 upper endoscopies, 4 colonoscopies, and 1 video capsule study.  The studies have only shown hemorrhoids, nothing more.  She saw Dr. Hodgson who advised hemorrhoidal banding.  He proceeded with 1 column 3 weeks ago (by history).  She tolerated the examination well.  She has a follow-up appointment on Tuesday.  Evidently her next banding has been delayed due to third-party payer issues.  In any case, in the last day or so the patient passed an alarming amount of bright red blood per rectum.  She took a photograph of it and showed it to me and indeed it appeared to be a significant amount of blood, all bright red.  She is feeling better now, and is no longer passing red blood.  She had a syncopal episode (?  Vasovagal episode) at the time of the rectal bleeding.  She feels fine now.  She has a history of IBS-C and has been treated with Linzess and Metamucil.    Past Medical History:  Past Medical History:   Diagnosis Date    Hemorrhoids     Iron deficiency anemia     Pruritic rash        Past Surgical History:  Past Surgical History:   Procedure Laterality Date    DILATATION AND CURETTAGE      HYSTERECTOMY  2021        Social History:   Social History     Tobacco Use    Smoking status: Never    Smokeless tobacco: Not on file   Substance Use Topics    Alcohol use: Yes     Comment: occasionally        Family History:  History reviewed. No pertinent family history.    Home Meds:  Medications Prior to Admission   Medication Sig Dispense Refill Last Dose    ACCRUFeR 30 MG capsule Take 1 capsule by mouth 2 (Two) Times a Day. Causes GI issues (Patient not taking: Reported on 7/10/2024)        linaclotide (Linzess) 72 MCG capsule capsule Take 1 capsule by mouth Every Morning Before Breakfast. (Patient not taking: Reported on 7/30/2024)       lubiprostone (AMITIZA) 8 MCG capsule Take 1 capsule by mouth 2 (Two) Times a Day With Meals.          Current Meds:   lubiprostone, 8 mcg, Oral, BID With Meals  potassium chloride ER, 40 mEq, Oral, Q4H  sodium chloride, 10 mL, Intravenous, Q12H        Allergies:  No Known Allergies    Review of Systems  Pertinent items are noted in HPI, all other systems reviewed and negative    Objective     Vital Signs  Temp:  [98.3 °F (36.8 °C)-98.5 °F (36.9 °C)] 98.4 °F (36.9 °C)  Heart Rate:  [71-94] 94  Resp:  [17-19] 18  BP: ()/(51-74) 104/56    Physical Exam:     General Appearance:  Alert, cooperative, in no acute distress   Head:  Normocephalic, without obvious abnormality, atraumatic   Eyes:  Lids and lashes normal, conjunctivae and sclerae normal, no icterus, no pallor, corneas clear, PERRLA   Ears:  Ears appear intact with no abnormalities noted   Throat:  No oral lesions, no thrush, oral mucosa moist   Neck:  No adenopathy, supple, trachea midline, no thyromegaly, no carotid bruit, no JVD   Back:  No kyphosis present, no scoliosis present, no skin lesions, erythema or scars, no tenderness to percussion, or palpation, range of motion normal   Lungs:  Clear to auscultation,respirations regular, even and unlabored    Heart:  Regular rhythm and normal rate, normal S1 and S2, no murmur, no gallop, no rub, no click   Breast Exam:  Deferred   Abdomen:  Normal bowel sounds, no masses, no organomegaly, soft non-tender, non-distended, no guarding, no rebound tenderness   Genitalia:  Deferred   Extremities:  Moves all extremities well, no edema, no cyanosis, no redness   Pulses:  Pulses palpable and equal bilaterally   Skin:  No bleeding, bruising or rash   Lymph nodes:  No palpable adenopathy   Neurologic:  Cranial nerves 2 - 12 grossly intact, sensation intact, DTR  "present and equal bilaterally       WBC No results found for: \"WBCS\"   HGB Hemoglobin   Date Value Ref Range Status   09/01/2024 9.2 (L) 12.0 - 15.9 g/dL Final   09/01/2024 9.6 (L) 12.0 - 15.9 g/dL Final   09/01/2024 11.7 (L) 12.0 - 15.9 g/dL Final      HCT Hematocrit   Date Value Ref Range Status   09/01/2024 29.2 (L) 34.0 - 46.6 % Final   09/01/2024 29.6 (L) 34.0 - 46.6 % Final   09/01/2024 37.1 34.0 - 46.6 % Final      Platelets No results found for: \"LABPLAT\"   MCV MCV   Date Value Ref Range Status   09/01/2024 97.1 (H) 79.0 - 97.0 fL Final          Sodium Sodium   Date Value Ref Range Status   09/01/2024 138 136 - 145 mmol/L Final   09/01/2024 139 136 - 145 mmol/L Final      Potassium Potassium   Date Value Ref Range Status   09/01/2024 4.3 3.5 - 5.2 mmol/L Final   09/01/2024 4.1 3.5 - 5.2 mmol/L Final   09/01/2024 3.6 3.5 - 5.2 mmol/L Final     Comment:     Slight hemolysis detected by analyzer. Result may be falsely elevated.      Chloride Chloride   Date Value Ref Range Status   09/01/2024 110 (H) 98 - 107 mmol/L Final   09/01/2024 108 (H) 98 - 107 mmol/L Final      CO2 CO2   Date Value Ref Range Status   09/01/2024 22.0 22.0 - 29.0 mmol/L Final   09/01/2024 20.0 (L) 22.0 - 29.0 mmol/L Final      BUN BUN   Date Value Ref Range Status   09/01/2024 11 6 - 20 mg/dL Final   09/01/2024 13 6 - 20 mg/dL Final      Creatinine Creatinine   Date Value Ref Range Status   09/01/2024 0.74 0.57 - 1.00 mg/dL Final   09/01/2024 0.69 0.57 - 1.00 mg/dL Final      Calcium Calcium   Date Value Ref Range Status   09/01/2024 7.9 (L) 8.6 - 10.5 mg/dL Final   09/01/2024 8.8 8.6 - 10.5 mg/dL Final      Albumin Albumin   Date Value Ref Range Status   09/01/2024 3.8 3.5 - 5.2 g/dL Final      AST  ALT  PT/INR:   AST (SGOT)   Date Value Ref Range Status   09/01/2024 17 1 - 32 U/L Final     ALT (SGPT)   Date Value Ref Range Status   09/01/2024 11 1 - 33 U/L Final     No results found for: \"PROTIME\"/No results found for: \"INR\"     "     Imaging Results (Last 72 Hours)       Procedure Component Value Units Date/Time    CT Angiogram Abdomen Pelvis [[id number]] Collected: 09/01/24 0730     Updated: 09/01/24 0741    Narrative:      EXAM: CT ANGIOGRAM ABDOMEN PELVIS- - 9/1/2024 4:24 AM     HISTORY: lower gi bleeding; K92.2-Gastrointestinal hemorrhage,  unspecified; D64.9-Anemia, unspecified; R55-Syncope and collapse       COMPARISON: None available.     DOSE LENGTH PRODUCT: 2046 mGy cm. Automatic exposure control was  utilized to make radiation dose as low as reasonably achievable.     TECHNIQUE: Enhanced axial images of the abdomen and pelvis obtained with  multiplanar reformats. 3D postprocessing, including MIPs, performed and  images saved to PACS.     FINDINGS:  VISUALIZED CHEST: No pericardial or pleural effusion is identified. The  lung bases are grossly clear.     LIVER/BILIARY: Small low-attenuation areas in the liver are too small to  characterize but likely represent small cysts. The gallbladder and bile  ducts are unremarkable.     KIDNEYS/URETERS: Bilateral kidneys and ureters are unremarkable. No  renal or ureteral calculi or hydronephrosis is identified. There is a  right renal cyst.     ADRENAL: Unremarkable.     SPLEEN: Unremarkable.     PANCREAS: Unremarkable.        PERITONEUM/RETROPERITONEUM: No mesenteric or retroperitoneal  lymphadenopathy or free fluid is seen. No inflammatory stranding is  seen.     GI TRACT: The abdominal portion of the GI tract is unremarkable. There  is no bowel obstruction or inflammatory change. The appendix is normal.     PELVIS: Small periumbilical hernia contains fat. Patient is status post  hysterectomy. Probable small dominant follicle in the right ovary  measures 1.8 cm. There is trace free fluid in the pelvis which is likely  physiologic. Urinary bladder is incompletely distended and unremarkable.  The pelvic portion of the GI tract is unremarkable. No active  extravasation of contrast is seen.      SOFT TISSUES: Normal appearance of the overlying abdominal  and pelvic  soft tissues.      BONES: No acute or aggressive bony lesion.            VESSELS:     AORTA/ABDOMINAL-PELVIC VESSELS: The abdominal aorta and branches are  patent without aneurysm, stenosis, or occlusion.             Impression:      1. Probable dominant follicle in the right ovary with a trace amount of  free fluid in the pelvis is likely physiologic.  2. Small periumbilical hernia containing fat.  3. Small probable cysts in the liver and right renal cyst.  4. No definite abnormality of the GI tract is seen or active  extravasation of contrast.     This report was signed and finalized on 9/1/2024 7:38 AM by Yaw Ervin.               Result Review:  I have personally reviewed the results from the time of this admission to 9/1/2024 13:40 CDT and agree with these findings:  [x]  Laboratory list / accordion  []  Microbiology  []  Radiology  []  EKG/Telemetry   []  Cardiology/Vascular   []  Pathology  [x]  Old records  []  Other:  Most notable findings include: No independent interpretation      Assessment & Plan       Lower GI bleed      There seems to be little doubt that this is hemorrhoidal bleeding.  This is a bit too delayed for it to be a post banding complication, as I understand the majority of those occur within the first week or so.  She tolerated it well and the literature says that she could probably be treated a bit more aggressively on repeat banding.  I of course would defer this to her treating physician.  At this time I really do not have anything to offer should she have  ongoing bleeding that requires intervention.  I do not do banding myself, so should this happen it would have to generate a surgical consult.    I discussed the patients findings and my recommendations with patient and nursing staff    Riccardo Barnes MD  09/01/24  13:40 CDT    DISCLAIMER:    This physician works through a [employer] as an  inpatient consultant gastroenterologist only and has no outpatient clinic for patient follow up.  Any results not available at time of inpatient discharge and/or GI clinic follow up should be managed by the hospitalist team, PCP, or outpatient gastroenterologist.

## 2024-09-02 VITALS
HEART RATE: 73 BPM | DIASTOLIC BLOOD PRESSURE: 67 MMHG | HEIGHT: 65 IN | SYSTOLIC BLOOD PRESSURE: 110 MMHG | TEMPERATURE: 98 F | BODY MASS INDEX: 33.65 KG/M2 | OXYGEN SATURATION: 100 % | WEIGHT: 201.94 LBS | RESPIRATION RATE: 18 BRPM

## 2024-09-02 LAB
HCT VFR BLD AUTO: 28 % (ref 34–46.6)
HGB BLD-MCNC: 8.8 G/DL (ref 12–15.9)

## 2024-09-02 PROCEDURE — 85014 HEMATOCRIT: CPT | Performed by: INTERNAL MEDICINE

## 2024-09-02 PROCEDURE — G0378 HOSPITAL OBSERVATION PER HR: HCPCS

## 2024-09-02 PROCEDURE — 85018 HEMOGLOBIN: CPT | Performed by: INTERNAL MEDICINE

## 2024-09-02 NOTE — PLAN OF CARE
Goal Outcome Evaluation:  Plan of Care Reviewed With: patient        Progress: improving  Outcome Evaluation: Pt A&O, BP soft, no dizziness/weakness/ or syncope. Pt has not had any BMs on this shift to asses for bleeding. Pt is worried she won't be DCd in time for her scheduled GI appointment with Dr. Hodgson in [place], [place]. She is hoping to DC today. No c/o pain or discomfort. H&H holding, next draw this AM. Telemetry SR 67-97.

## 2024-09-02 NOTE — DISCHARGE SUMMARY
[facility]  DISCHARGE SUMMARY       Date of Admission: 9/1/2024  Date of Discharge:  9/2/2024  Primary Care Physician: Guillermina Jovel APRN    Presenting Problem/History of Present Illness:  Rectal bleeding    Final Discharge Diagnoses:  Rectal bleeding, hemorrhoids  Anemia, iron deficiency, from chronic blood loss    Consults:   Gastroenterology Dr. Barnes    Procedures Performed:   None    Pertinent Test Results:   Imaging Results (Last 7 Days)       Procedure Component Value Units Date/Time    CT Angiogram Abdomen Pelvis [[id number]] Collected: 09/01/24 0730     Updated: 09/01/24 0741    Narrative:      EXAM: CT ANGIOGRAM ABDOMEN PELVIS- - 9/1/2024 4:24 AM     HISTORY: lower gi bleeding; K92.2-Gastrointestinal hemorrhage,  unspecified; D64.9-Anemia, unspecified; R55-Syncope and collapse       COMPARISON: None available.     DOSE LENGTH PRODUCT: 2046 mGy cm. Automatic exposure control was  utilized to make radiation dose as low as reasonably achievable.     TECHNIQUE: Enhanced axial images of the abdomen and pelvis obtained with  multiplanar reformats. 3D postprocessing, including MIPs, performed and  images saved to PACS.     FINDINGS:  VISUALIZED CHEST: No pericardial or pleural effusion is identified. The  lung bases are grossly clear.     LIVER/BILIARY: Small low-attenuation areas in the liver are too small to  characterize but likely represent small cysts. The gallbladder and bile  ducts are unremarkable.     KIDNEYS/URETERS: Bilateral kidneys and ureters are unremarkable. No  renal or ureteral calculi or hydronephrosis is identified. There is a  right renal cyst.     ADRENAL: Unremarkable.     SPLEEN: Unremarkable.     PANCREAS: Unremarkable.        PERITONEUM/RETROPERITONEUM: No mesenteric or retroperitoneal  lymphadenopathy or free fluid is seen. No inflammatory stranding is  seen.     GI TRACT: The abdominal portion of the GI tract is unremarkable.  There  is no bowel obstruction or inflammatory change. The appendix is normal.     PELVIS: Small periumbilical hernia contains fat. Patient is status post  hysterectomy. Probable small dominant follicle in the right ovary  measures 1.8 cm. There is trace free fluid in the pelvis which is likely  physiologic. Urinary bladder is incompletely distended and unremarkable.  The pelvic portion of the GI tract is unremarkable. No active  extravasation of contrast is seen.     SOFT TISSUES: Normal appearance of the overlying abdominal  and pelvic  soft tissues.      BONES: No acute or aggressive bony lesion.            VESSELS:     AORTA/ABDOMINAL-PELVIC VESSELS: The abdominal aorta and branches are  patent without aneurysm, stenosis, or occlusion.             Impression:      1. Probable dominant follicle in the right ovary with a trace amount of  free fluid in the pelvis is likely physiologic.  2. Small periumbilical hernia containing fat.  3. Small probable cysts in the liver and right renal cyst.  4. No definite abnormality of the GI tract is seen or active  extravasation of contrast.     This report was signed and finalized on 9/1/2024 7:38 AM by Yaw Ervin.             Lab Results (last 7 days)       Procedure Component Value Units Date/Time    Hemoglobin & Hematocrit, Blood [[id number]]  (Abnormal) Collected: 09/02/24 0330    Specimen: Blood Updated: 09/02/24 0429     Hemoglobin 8.8 g/dL      Hematocrit 28.0 %     Hemoglobin & Hematocrit, Blood [[id number]]  (Abnormal) Collected: 09/01/24 1935    Specimen: Blood Updated: 09/01/24 1943     Hemoglobin 9.0 g/dL      Hematocrit 28.7 %     Potassium [[id number]]  (Normal) Collected: 09/01/24 1213    Specimen: Blood Updated: 09/01/24 1230     Potassium 4.3 mmol/L     Hemoglobin & Hematocrit, Blood [[id number]]  (Abnormal) Collected: 09/01/24 1213    Specimen: Blood Updated: 09/01/24 1220     Hemoglobin 9.2 g/dL      Hematocrit 29.2 %     Ferritin [[id number]]  (Normal)  Collected: 09/01/24 0638    Specimen: Blood Updated: 09/01/24 1150     Ferritin 78.08 ng/mL     Narrative:      Results may be falsely decreased if patient taking Biotin.      Basic Metabolic Panel [[id number]]  (Abnormal) Collected: 09/01/24 0638    Specimen: Blood Updated: 09/01/24 0757     Glucose 106 mg/dL      BUN 11 mg/dL      Creatinine 0.74 mg/dL      Sodium 138 mmol/L      Potassium 4.1 mmol/L      Chloride 110 mmol/L      CO2 22.0 mmol/L      Calcium 7.9 mg/dL      BUN/Creatinine Ratio 14.9     Anion Gap 6.0 mmol/L      eGFR 107.7 mL/min/1.73     Narrative:      GFR Normal >60  Chronic Kidney Disease <60  Kidney Failure <15      Iron Profile [[id number]]  (Abnormal) Collected: 09/01/24 0307    Specimen: Blood Updated: 09/01/24 0614     Iron 50 mcg/dL      Iron Saturation (TSAT) 14 %      Transferrin 246 mg/dL      TIBC 367 mcg/dL     Hemoglobin & Hematocrit, Blood [[id number]]  (Abnormal) Collected: 09/01/24 0423    Specimen: Blood Updated: 09/01/24 0456     Hemoglobin 9.6 g/dL      Hematocrit 29.6 %     Pregnancy, Urine - Urine, Clean Catch [[id number]]  (Normal) Collected: 09/01/24 0412    Specimen: Urine, Clean Catch Updated: 09/01/24 0443     HCG, Urine QL Negative    Urinalysis With Microscopic If Indicated (No Culture) - Urine, Clean Catch [[id number]]  (Abnormal) Collected: 09/01/24 0412    Specimen: Urine, Clean Catch Updated: 09/01/24 0442     Color, UA Dark Yellow     Appearance, UA Clear     pH, UA 5.5     Specific Gravity, UA 1.023     Glucose, UA Negative     Ketones, UA Negative     Bilirubin, UA Negative     Blood, UA Negative     Protein, UA Negative     Leuk Esterase, UA Negative     Nitrite, UA Negative     Urobilinogen, UA 1.0 E.U./dL    Narrative:      Urine microscopic not indicated.    Single High Sensitivity Troponin T [[id number]]  (Normal) Collected: 09/01/24 0307    Specimen: Blood Updated: 09/01/24 0352     HS Troponin T <6 ng/L     Narrative:      High Sensitive Troponin T Reference  Range:  <14.0 ng/L- Negative Female for AMI  <22.0 ng/L- Negative Male for AMI  >=14 - Abnormal Female indicating possible myocardial injury.  >=22 - Abnormal Male indicating possible myocardial injury.   Clinicians would have to utilize clinical acumen, EKG, Troponin, and serial changes to determine if it is an Acute Myocardial Infarction or myocardial injury due to an underlying chronic condition.         Comprehensive Metabolic Panel [[id number]]  (Abnormal) Collected: 09/01/24 0307    Specimen: Blood Updated: 09/01/24 0334     Glucose 122 mg/dL      BUN 13 mg/dL      Creatinine 0.69 mg/dL      Sodium 139 mmol/L      Potassium 3.6 mmol/L      Comment: Slight hemolysis detected by analyzer. Result may be falsely elevated.        Chloride 108 mmol/L      CO2 20.0 mmol/L      Calcium 8.8 mg/dL      Total Protein 6.3 g/dL      Albumin 3.8 g/dL      ALT (SGPT) 11 U/L      AST (SGOT) 17 U/L      Alkaline Phosphatase 78 U/L      Total Bilirubin 0.3 mg/dL      Globulin 2.5 gm/dL      A/G Ratio 1.5 g/dL      BUN/Creatinine Ratio 18.8     Anion Gap 11.0 mmol/L      eGFR 115.5 mL/min/1.73     Narrative:      GFR Normal >60  Chronic Kidney Disease <60  Kidney Failure <15      Lipase [[id number]]  (Normal) Collected: 09/01/24 0307    Specimen: Blood Updated: 09/01/24 0334     Lipase 33 U/L     POC Occult Blood Stool [[id number]]  (Abnormal) Resulted: 09/01/24 0322    Specimen: Stool Updated: 09/01/24 0322     Fecal Occult Blood Positive     Lot Number [id number]     Expiration Date 02/28/2025     DEVELOPER LOT NUMBER 271     DEVELOPER EXPIRATION DATE 02/28/2025     Positive Control Positive     Negative Control Negative    CBC & Differential [[id number]]  (Abnormal) Collected: 09/01/24 0307    Specimen: Blood Updated: 09/01/24 0319    Narrative:      The following orders were created for panel order CBC & Differential.  Procedure                               Abnormality         Status                     ---------                                -----------         ------                     CBC Auto Differential[[id number]]        Abnormal            Final result                 Please view results for these tests on the individual orders.    CBC Auto Differential [[id number]]  (Abnormal) Collected: 09/01/24 0307    Specimen: Blood Updated: 09/01/24 0319     WBC 10.83 10*3/mm3      RBC 3.82 10*6/mm3      Hemoglobin 11.7 g/dL      Hematocrit 37.1 %      MCV 97.1 fL      MCH 30.6 pg      MCHC 31.5 g/dL      RDW 15.1 %      RDW-SD 53.9 fl      MPV 10.2 fL      Platelets 280 10*3/mm3      Neutrophil % 70.6 %      Lymphocyte % 21.1 %      Monocyte % 4.8 %      Eosinophil % 2.7 %      Basophil % 0.5 %      Immature Grans % 0.3 %      Neutrophils, Absolute 7.65 10*3/mm3      Lymphocytes, Absolute 2.29 10*3/mm3      Monocytes, Absolute 0.52 10*3/mm3      Eosinophils, Absolute 0.29 10*3/mm3      Basophils, Absolute 0.05 10*3/mm3      Immature Grans, Absolute 0.03 10*3/mm3      nRBC 0.0 /100 WBC     Reeder Draw [[id number]] Collected: 09/01/24 0307    Specimen: Blood Updated: 09/01/24 0316    Narrative:      The following orders were created for panel order Reeder Draw.  Procedure                               Abnormality         Status                     ---------                               -----------         ------                     Green Top (Gel)[[id number]]                                  Final result               Lavender Top[[id number]]                                     Final result               Red Top[[id number]]                                          Final result               Light Blue Top[[id number]]                                   Final result                 Please view results for these tests on the individual orders.    Green Top (Gel) [[id number]] Collected: 09/01/24 0307    Specimen: Blood Updated: 09/01/24 0316     Extra Tube Hold for add-ons.     Comment: Auto resulted.       Lavender Top [[id number]] Collected: 09/01/24 0307     "Specimen: Blood Updated: 09/01/24 0316     Extra Tube hold for add-on     Comment: Auto resulted       Red Top [[id number]] Collected: 09/01/24 0307    Specimen: Blood Updated: 09/01/24 0316     Extra Tube Hold for add-ons.     Comment: Auto resulted.       Light Blue Top [[id number]] Collected: 09/01/24 0307    Specimen: Blood Updated: 09/01/24 0316     Extra Tube Hold for add-ons.     Comment: Auto resulted             Hospital Course:  The patient is a 38 y.o. female who presented to [facility] with bright red rectal bleeding.  She had recently had a hemorrhoidal banding.  She has had an ongoing chronic issue with rectal blood loss from significant hemorrhoids.  She is currently seeing Dr. Hodgson in [place] for banding of the hemorrhoids.    Patient is also following with hematology for the anemia.  Patient received IV fluids.  Serial hemoglobin/hematocrit were followed.  Orthostatic blood pressures were followed.  Patient stabilized.  She is currently having no blood loss.  Her most recent hemoglobin is 8.8.  She is not lightheaded.  She would like to go home today so she can follow-up with Dr. Hodgson in the morning.      Physical Exam on Discharge:  /67 (BP Location: Right arm, Patient Position: Lying)   Pulse 73   Temp 98 °F (36.7 °C) (Oral)   Resp 18   Ht 165.1 cm (65\")   Wt 91.6 kg (201 lb 15.1 oz)   SpO2 100%   BMI 33.60 kg/m²   Physical Exam  Vitals and nursing note reviewed.   Constitutional:       Appearance: Normal appearance. She is well-developed.   HENT:      Head: Normocephalic and atraumatic.      Right Ear: External ear normal.      Left Ear: External ear normal.      Nose: Nose normal.      Mouth/Throat:      Mouth: Mucous membranes are moist.   Eyes:      Extraocular Movements: Extraocular movements intact.      Conjunctiva/sclera: Conjunctivae normal.      Pupils: Pupils are equal, round, and reactive to light.   Neck:      Thyroid: No thyromegaly.      Vascular: No JVD. "      Trachea: No tracheal deviation.   Cardiovascular:      Rate and Rhythm: Normal rate and regular rhythm.      Pulses: Normal pulses.      Heart sounds: Normal heart sounds. No murmur heard.     No friction rub. No gallop.   Pulmonary:      Effort: Pulmonary effort is normal.      Breath sounds: Normal breath sounds.   Abdominal:      General: Bowel sounds are normal. There is no distension.      Palpations: Abdomen is soft.      Tenderness: There is no abdominal tenderness.   Musculoskeletal:         General: Normal range of motion.      Cervical back: Normal range of motion and neck supple.   Lymphadenopathy:      Cervical: No cervical adenopathy.   Skin:     General: Skin is warm and dry.      Capillary Refill: Capillary refill takes less than 2 seconds.   Neurological:      Mental Status: She is alert and oriented to person, place, and time.      Cranial Nerves: No cranial nerve deficit.      Coordination: Coordination normal.   Psychiatric:         Mood and Affect: Mood normal.         Behavior: Behavior normal.           Condition on Discharge:   Stable improved    Discharge Disposition:  Home or Self Care    Discharge Medications:     Discharge Medications        Continue These Medications        Instructions Start Date   lubiprostone 8 MCG capsule  Commonly known as: AMITIZA   8 mcg, Oral, 2 Times Daily With Meals             Stop These Medications      ACCRUFeR 30 MG capsule  Generic drug: Ferric Maltol     Linzess 72 MCG capsule capsule  Generic drug: linaclotide            Discharge Diet:   Diet Instructions       Diet: Gastrointestinal Diets; Fiber-Restricted; Regular (IDDSI 7); Thin (IDDSI 0)      Discharge Diet: Gastrointestinal Diets    Gastrointestinal Diet: Fiber-Restricted    Texture: Regular (IDDSI 7)    Fluid Consistency: Thin (IDDSI 0)          Activity at Discharge:   Activity Instructions       Activity as Tolerated      No HIT exercises until released by PCP            Follow-up  Appointments:   Future Appointments   Date Time Provider Department Center   10/9/2024  8:15 AM Carolyn Abraham APRN [facility]     Dr Hodgson keep scheduled appointment in a.m.  Hematology follow-up on Wednesday    Test Results Pending at Discharge: None    Parvin Jackson DO  09/02/24  09:14 CDT    Time: 35 minutes.

## 2024-09-03 ENCOUNTER — TELEPHONE (OUTPATIENT)
Dept: ONCOLOGY | Facility: CLINIC | Age: 39
End: 2024-09-03
Payer: COMMERCIAL

## 2024-09-03 DIAGNOSIS — D50.9 IRON DEFICIENCY ANEMIA, UNSPECIFIED IRON DEFICIENCY ANEMIA TYPE: Primary | ICD-10-CM

## 2024-09-03 LAB
FERRITIN SERPL-MCNC: 76.33 NG/ML (ref 13–150)
IRON 24H UR-MRATE: 93 MCG/DL (ref 37–145)
IRON SATN MFR SERPL: 30 % (ref 20–50)
TIBC SERPL-MCNC: 308 MCG/DL (ref 298–536)
TRANSFERRIN SERPL-MCNC: 207 MG/DL (ref 200–360)

## 2024-09-03 NOTE — TELEPHONE ENCOUNTER
Caller: Marni Adair    Relationship: Self    Best call back number: [phone]     What is the best time to reach you: ANYTIME    Who are you requesting to speak with (clinical staff, provider,  specific staff member): CLINICAL    What was the call regarding: PATIENT WAS ADMITTED TO [facility] THIS WEEKEND AND DISCHARGED YESTERDAY. SHE HAD SEVERAL EPISODES OF PASSING OUT AND AN EXTREME GI BLEED. HER HEMOGLOBIN WAS 11.6 ON ADMISSION AND 8.8 ON DISCHARGE. SHE NEEDS TO F/U WITH ARLETTE MARIE.     PLEASE ADVISE.

## 2024-09-04 ENCOUNTER — TELEPHONE (OUTPATIENT)
Dept: ONCOLOGY | Facility: CLINIC | Age: 39
End: 2024-09-04

## 2024-09-04 DIAGNOSIS — D50.9 IRON DEFICIENCY ANEMIA, UNSPECIFIED IRON DEFICIENCY ANEMIA TYPE: ICD-10-CM

## 2024-09-04 DIAGNOSIS — E53.8 B12 DEFICIENCY: Primary | ICD-10-CM

## 2024-09-16 ENCOUNTER — TELEPHONE (OUTPATIENT)
Dept: ONCOLOGY | Facility: CLINIC | Age: 39
End: 2024-09-16
Payer: COMMERCIAL

## 2024-09-17 DIAGNOSIS — D50.9 IRON DEFICIENCY ANEMIA, UNSPECIFIED IRON DEFICIENCY ANEMIA TYPE: Primary | ICD-10-CM

## 2024-09-19 RX ORDER — DIPHENHYDRAMINE HYDROCHLORIDE 50 MG/ML
50 INJECTION INTRAMUSCULAR; INTRAVENOUS AS NEEDED
OUTPATIENT
Start: 2024-09-27

## 2024-09-19 RX ORDER — DIPHENHYDRAMINE HYDROCHLORIDE 50 MG/ML
50 INJECTION INTRAMUSCULAR; INTRAVENOUS AS NEEDED
OUTPATIENT
Start: 2024-10-04

## 2024-09-19 RX ORDER — FAMOTIDINE 10 MG/ML
20 INJECTION, SOLUTION INTRAVENOUS AS NEEDED
OUTPATIENT
Start: 2024-10-04

## 2024-09-19 RX ORDER — SODIUM CHLORIDE 9 MG/ML
20 INJECTION, SOLUTION INTRAVENOUS ONCE
OUTPATIENT
Start: 2024-10-04

## 2024-09-19 RX ORDER — ACETAMINOPHEN 325 MG/1
650 TABLET ORAL ONCE
OUTPATIENT
Start: 2024-09-27

## 2024-09-19 RX ORDER — FAMOTIDINE 10 MG/ML
20 INJECTION, SOLUTION INTRAVENOUS AS NEEDED
OUTPATIENT
Start: 2024-09-27

## 2024-09-19 RX ORDER — ACETAMINOPHEN 325 MG/1
650 TABLET ORAL ONCE
OUTPATIENT
Start: 2024-10-04

## 2024-09-19 RX ORDER — SODIUM CHLORIDE 9 MG/ML
20 INJECTION, SOLUTION INTRAVENOUS ONCE
OUTPATIENT
Start: 2024-09-27

## 2024-09-20 ENCOUNTER — INFUSION (OUTPATIENT)
Dept: ONCOLOGY | Facility: HOSPITAL | Age: 39
End: 2024-09-20
Payer: COMMERCIAL

## 2024-09-20 VITALS
WEIGHT: 205.8 LBS | DIASTOLIC BLOOD PRESSURE: 60 MMHG | HEART RATE: 77 BPM | OXYGEN SATURATION: 98 % | RESPIRATION RATE: 17 BRPM | HEIGHT: 65 IN | SYSTOLIC BLOOD PRESSURE: 116 MMHG | BODY MASS INDEX: 34.29 KG/M2 | TEMPERATURE: 97.2 F

## 2024-09-20 DIAGNOSIS — D50.9 IRON DEFICIENCY ANEMIA, UNSPECIFIED IRON DEFICIENCY ANEMIA TYPE: Primary | ICD-10-CM

## 2024-09-20 PROCEDURE — 25010000002 DIPHENHYDRAMINE PER 50 MG: Performed by: NURSE PRACTITIONER

## 2024-09-20 PROCEDURE — 96365 THER/PROPH/DIAG IV INF INIT: CPT

## 2024-09-20 PROCEDURE — 25810000003 SODIUM CHLORIDE 0.9 % SOLUTION: Performed by: NURSE PRACTITIONER

## 2024-09-20 PROCEDURE — 25010000002 NA FERRIC GLUC CPLX PER 12.5 MG: Performed by: NURSE PRACTITIONER

## 2024-09-20 PROCEDURE — 96366 THER/PROPH/DIAG IV INF ADDON: CPT

## 2024-09-20 PROCEDURE — 96367 TX/PROPH/DG ADDL SEQ IV INF: CPT

## 2024-09-20 RX ORDER — ACETAMINOPHEN 325 MG/1
650 TABLET ORAL ONCE
Status: COMPLETED | OUTPATIENT
Start: 2024-09-20 | End: 2024-09-20

## 2024-09-20 RX ORDER — SODIUM CHLORIDE 9 MG/ML
20 INJECTION, SOLUTION INTRAVENOUS ONCE
Status: COMPLETED | OUTPATIENT
Start: 2024-09-20 | End: 2024-09-20

## 2024-09-20 RX ORDER — DIPHENHYDRAMINE HYDROCHLORIDE 50 MG/ML
50 INJECTION INTRAMUSCULAR; INTRAVENOUS AS NEEDED
Status: DISCONTINUED | OUTPATIENT
Start: 2024-09-20 | End: 2024-09-20 | Stop reason: HOSPADM

## 2024-09-20 RX ORDER — FAMOTIDINE 10 MG/ML
20 INJECTION, SOLUTION INTRAVENOUS AS NEEDED
Status: DISCONTINUED | OUTPATIENT
Start: 2024-09-20 | End: 2024-09-20 | Stop reason: HOSPADM

## 2024-09-20 RX ADMIN — DIPHENHYDRAMINE HYDROCHLORIDE 25 MG: 50 INJECTION, SOLUTION INTRAMUSCULAR; INTRAVENOUS at 12:46

## 2024-09-20 RX ADMIN — SODIUM CHLORIDE 20 ML/HR: 9 INJECTION, SOLUTION INTRAVENOUS at 12:40

## 2024-09-20 RX ADMIN — ACETAMINOPHEN 650 MG: 325 TABLET ORAL at 12:46

## 2024-09-20 RX ADMIN — SODIUM CHLORIDE 250 MG: 9 INJECTION, SOLUTION INTRAVENOUS at 13:13

## 2024-09-22 LAB
QT INTERVAL: 382 MS
QTC INTERVAL: 397 MS

## 2024-09-27 ENCOUNTER — INFUSION (OUTPATIENT)
Dept: ONCOLOGY | Facility: HOSPITAL | Age: 39
End: 2024-09-27
Payer: COMMERCIAL

## 2024-09-27 VITALS
BODY MASS INDEX: 33.85 KG/M2 | DIASTOLIC BLOOD PRESSURE: 79 MMHG | SYSTOLIC BLOOD PRESSURE: 111 MMHG | TEMPERATURE: 98 F | HEIGHT: 65 IN | WEIGHT: 203.2 LBS | RESPIRATION RATE: 18 BRPM | HEART RATE: 74 BPM | OXYGEN SATURATION: 99 %

## 2024-09-27 DIAGNOSIS — D50.9 IRON DEFICIENCY ANEMIA, UNSPECIFIED IRON DEFICIENCY ANEMIA TYPE: Primary | ICD-10-CM

## 2024-09-27 PROCEDURE — 96366 THER/PROPH/DIAG IV INF ADDON: CPT

## 2024-09-27 PROCEDURE — 25810000003 SODIUM CHLORIDE 0.9 % SOLUTION: Performed by: NURSE PRACTITIONER

## 2024-09-27 PROCEDURE — 96365 THER/PROPH/DIAG IV INF INIT: CPT

## 2024-09-27 PROCEDURE — 25010000002 NA FERRIC GLUC CPLX PER 12.5 MG: Performed by: NURSE PRACTITIONER

## 2024-09-27 RX ORDER — SODIUM CHLORIDE 9 MG/ML
20 INJECTION, SOLUTION INTRAVENOUS ONCE
Status: COMPLETED | OUTPATIENT
Start: 2024-09-27 | End: 2024-09-27

## 2024-09-27 RX ORDER — FAMOTIDINE 10 MG/ML
20 INJECTION, SOLUTION INTRAVENOUS AS NEEDED
Status: DISCONTINUED | OUTPATIENT
Start: 2024-09-27 | End: 2024-09-27 | Stop reason: HOSPADM

## 2024-09-27 RX ORDER — DIPHENHYDRAMINE HYDROCHLORIDE 50 MG/ML
50 INJECTION INTRAMUSCULAR; INTRAVENOUS AS NEEDED
Status: DISCONTINUED | OUTPATIENT
Start: 2024-09-27 | End: 2024-09-27 | Stop reason: HOSPADM

## 2024-09-27 RX ORDER — ACETAMINOPHEN 325 MG/1
650 TABLET ORAL ONCE
Status: COMPLETED | OUTPATIENT
Start: 2024-09-27 | End: 2024-09-27

## 2024-09-27 RX ADMIN — ACETAMINOPHEN 650 MG: 325 TABLET ORAL at 13:21

## 2024-09-27 RX ADMIN — SODIUM CHLORIDE 20 ML/HR: 9 INJECTION, SOLUTION INTRAVENOUS at 13:22

## 2024-09-27 RX ADMIN — SODIUM CHLORIDE 250 MG: 9 INJECTION, SOLUTION INTRAVENOUS at 13:30

## 2024-10-04 ENCOUNTER — INFUSION (OUTPATIENT)
Dept: ONCOLOGY | Facility: HOSPITAL | Age: 39
End: 2024-10-04
Payer: COMMERCIAL

## 2024-10-04 VITALS
TEMPERATURE: 97 F | WEIGHT: 203 LBS | RESPIRATION RATE: 16 BRPM | OXYGEN SATURATION: 100 % | HEART RATE: 85 BPM | HEIGHT: 65 IN | DIASTOLIC BLOOD PRESSURE: 55 MMHG | BODY MASS INDEX: 33.82 KG/M2 | SYSTOLIC BLOOD PRESSURE: 106 MMHG

## 2024-10-04 DIAGNOSIS — D50.9 IRON DEFICIENCY ANEMIA, UNSPECIFIED IRON DEFICIENCY ANEMIA TYPE: Primary | ICD-10-CM

## 2024-10-04 PROCEDURE — 25010000002 NA FERRIC GLUC CPLX PER 12.5 MG: Performed by: NURSE PRACTITIONER

## 2024-10-04 PROCEDURE — 96365 THER/PROPH/DIAG IV INF INIT: CPT

## 2024-10-04 PROCEDURE — 96366 THER/PROPH/DIAG IV INF ADDON: CPT

## 2024-10-04 PROCEDURE — 25810000003 SODIUM CHLORIDE 0.9 % SOLUTION: Performed by: NURSE PRACTITIONER

## 2024-10-04 RX ORDER — DIPHENHYDRAMINE HYDROCHLORIDE 50 MG/ML
50 INJECTION INTRAMUSCULAR; INTRAVENOUS AS NEEDED
Status: DISCONTINUED | OUTPATIENT
Start: 2024-10-04 | End: 2024-10-04 | Stop reason: HOSPADM

## 2024-10-04 RX ORDER — FAMOTIDINE 10 MG/ML
20 INJECTION, SOLUTION INTRAVENOUS AS NEEDED
Status: DISCONTINUED | OUTPATIENT
Start: 2024-10-04 | End: 2024-10-04 | Stop reason: HOSPADM

## 2024-10-04 RX ORDER — ACETAMINOPHEN 325 MG/1
650 TABLET ORAL ONCE
Status: COMPLETED | OUTPATIENT
Start: 2024-10-04 | End: 2024-10-04

## 2024-10-04 RX ORDER — SODIUM CHLORIDE 9 MG/ML
20 INJECTION, SOLUTION INTRAVENOUS ONCE
Status: COMPLETED | OUTPATIENT
Start: 2024-10-04 | End: 2024-10-04

## 2024-10-04 RX ADMIN — SODIUM CHLORIDE 20 ML/HR: 9 INJECTION, SOLUTION INTRAVENOUS at 12:48

## 2024-10-04 RX ADMIN — ACETAMINOPHEN 650 MG: 325 TABLET ORAL at 12:48

## 2024-10-04 RX ADMIN — SODIUM CHLORIDE 250 MG: 9 INJECTION, SOLUTION INTRAVENOUS at 12:48
